# Patient Record
Sex: FEMALE | Race: BLACK OR AFRICAN AMERICAN | Employment: OTHER | URBAN - METROPOLITAN AREA
[De-identification: names, ages, dates, MRNs, and addresses within clinical notes are randomized per-mention and may not be internally consistent; named-entity substitution may affect disease eponyms.]

---

## 2017-09-27 NOTE — PATIENT DISCUSSION
MODERATE KERATOCONJUCTIVITIS WITH DRY EYE, OU:  PATIENT DESIRES TO NOT TAKE RESTASIS ANYMORE. CONTINUE HIGH QUALITY ARTIFICIAL TEARS. RECOMMEND THE DAILY INTAKE OF OMEGA-3 DHA/EPA FATTY ACIDS TO HELP RELIEVE SYMPTOMS WITH PRIMARY CARE PHYSICIANS APPROVAL. IF SYMPTOMS WORSEN, START NIGHTLY LUBRICATING OINTMENT OR GEL. WILL CONSIDER PUNCTAL PLUGS NEXT VISIT IF NOT RESPONSIVE OR IF SYMPTOMS PERSIST. RETURN FOR FOLLOW-UP AS SCHEDULED OR SOONER IF SYMPTOMS WORSEN.

## 2017-09-27 NOTE — PATIENT DISCUSSION
Continue: Refresh Liquigel (carboxymethylcellulose sodium): drops, liquid gel: 1% 1 drop as needed into both eyes

## 2017-10-24 ENCOUNTER — HOSPITAL ENCOUNTER (OUTPATIENT)
Dept: LAB | Age: 62
Discharge: HOME | End: 2017-10-24
Payer: MEDICARE

## 2017-10-24 DIAGNOSIS — R73.03 DIABETES MELLITUS, LATENT: ICD-10-CM

## 2017-10-24 DIAGNOSIS — E07.9 DISEASE OF THYROID GLAND: ICD-10-CM

## 2017-10-24 DIAGNOSIS — E55.9 AVITAMINOSIS D: ICD-10-CM

## 2017-10-24 DIAGNOSIS — F41.9 ANXIETY: ICD-10-CM

## 2017-10-24 DIAGNOSIS — E78.5 HYPERLIPEMIA: ICD-10-CM

## 2017-10-24 DIAGNOSIS — E53.8 HOLOCARBOXYLASE SYNTHETASE DEFICIENCY: ICD-10-CM

## 2017-10-24 DIAGNOSIS — R79.89 HYPOURICEMIA: ICD-10-CM

## 2017-10-24 LAB
25(OH)D3 SERPL-MCNC: 17.9 NG/ML (ref 30–100)
ALBUMIN SERPL-MCNC: 3.9 G/DL (ref 3.4–5)
ALBUMIN/GLOB SERPL: 1.1 {RATIO} (ref 0.8–1.7)
ALP SERPL-CCNC: 53 U/L (ref 45–117)
ALT SERPL-CCNC: 20 U/L (ref 13–56)
ANION GAP SERPL CALC-SCNC: 8 MMOL/L (ref 3–18)
AST SERPL-CCNC: 18 U/L (ref 15–37)
BASOPHILS # BLD: 0 K/UL (ref 0–0.06)
BASOPHILS NFR BLD: 1 % (ref 0–2)
BILIRUB SERPL-MCNC: 0.7 MG/DL (ref 0.2–1)
BUN SERPL-MCNC: 13 MG/DL (ref 7–18)
BUN/CREAT SERPL: 19 (ref 12–20)
CALCIUM SERPL-MCNC: 8.6 MG/DL (ref 8.5–10.1)
CHLORIDE SERPL-SCNC: 103 MMOL/L (ref 100–108)
CHOLEST SERPL-MCNC: 153 MG/DL
CO2 SERPL-SCNC: 28 MMOL/L (ref 21–32)
CREAT SERPL-MCNC: 0.69 MG/DL (ref 0.6–1.3)
DIFFERENTIAL METHOD BLD: NORMAL
EOSINOPHIL # BLD: 0.1 K/UL (ref 0–0.4)
EOSINOPHIL NFR BLD: 3 % (ref 0–5)
ERYTHROCYTE [DISTWIDTH] IN BLOOD BY AUTOMATED COUNT: 14.3 % (ref 11.6–14.5)
FERRITIN SERPL-MCNC: 12 NG/ML (ref 8–388)
GLOBULIN SER CALC-MCNC: 3.6 G/DL (ref 2–4)
GLUCOSE SERPL-MCNC: 88 MG/DL (ref 74–99)
HBA1C MFR BLD: 6.1 % (ref 4.2–5.6)
HCT VFR BLD AUTO: 38.9 % (ref 35–45)
HDLC SERPL-MCNC: 64 MG/DL (ref 40–60)
HDLC SERPL: 2.4 {RATIO} (ref 0–5)
HGB BLD-MCNC: 12.1 G/DL (ref 12–16)
IRON SERPL-MCNC: 54 UG/DL (ref 50–175)
LDLC SERPL CALC-MCNC: 53 MG/DL (ref 0–100)
LIPID PROFILE,FLP: ABNORMAL
LYMPHOCYTES # BLD: 2.2 K/UL (ref 0.9–3.6)
LYMPHOCYTES NFR BLD: 41 % (ref 21–52)
MCH RBC QN AUTO: 24.8 PG (ref 24–34)
MCHC RBC AUTO-ENTMCNC: 31.1 G/DL (ref 31–37)
MCV RBC AUTO: 79.7 FL (ref 74–97)
MONOCYTES # BLD: 0.3 K/UL (ref 0.05–1.2)
MONOCYTES NFR BLD: 6 % (ref 3–10)
NEUTS SEG # BLD: 2.6 K/UL (ref 1.8–8)
NEUTS SEG NFR BLD: 49 % (ref 40–73)
PLATELET # BLD AUTO: 354 K/UL (ref 135–420)
PMV BLD AUTO: 9.2 FL (ref 9.2–11.8)
POTASSIUM SERPL-SCNC: 4 MMOL/L (ref 3.5–5.5)
PROT SERPL-MCNC: 7.5 G/DL (ref 6.4–8.2)
RBC # BLD AUTO: 4.88 M/UL (ref 4.2–5.3)
SODIUM SERPL-SCNC: 139 MMOL/L (ref 136–145)
TRIGL SERPL-MCNC: 180 MG/DL (ref ?–150)
TSH SERPL DL<=0.05 MIU/L-ACNC: 1.07 UIU/ML (ref 0.36–3.74)
VIT B12 SERPL-MCNC: 369 PG/ML (ref 211–911)
VLDLC SERPL CALC-MCNC: 36 MG/DL
WBC # BLD AUTO: 5.3 K/UL (ref 4.6–13.2)

## 2017-10-24 PROCEDURE — 82306 VITAMIN D 25 HYDROXY: CPT | Performed by: NURSE PRACTITIONER

## 2017-10-24 PROCEDURE — 83540 ASSAY OF IRON: CPT | Performed by: NURSE PRACTITIONER

## 2017-10-24 PROCEDURE — 83036 HEMOGLOBIN GLYCOSYLATED A1C: CPT | Performed by: NURSE PRACTITIONER

## 2017-10-24 PROCEDURE — 80061 LIPID PANEL: CPT | Performed by: NURSE PRACTITIONER

## 2017-10-24 PROCEDURE — 82728 ASSAY OF FERRITIN: CPT | Performed by: NURSE PRACTITIONER

## 2017-10-24 PROCEDURE — 84443 ASSAY THYROID STIM HORMONE: CPT | Performed by: NURSE PRACTITIONER

## 2017-10-24 PROCEDURE — 85025 COMPLETE CBC W/AUTO DIFF WBC: CPT | Performed by: NURSE PRACTITIONER

## 2017-10-24 PROCEDURE — 36415 COLL VENOUS BLD VENIPUNCTURE: CPT | Performed by: NURSE PRACTITIONER

## 2017-10-24 PROCEDURE — 82607 VITAMIN B-12: CPT | Performed by: NURSE PRACTITIONER

## 2017-10-24 PROCEDURE — 80053 COMPREHEN METABOLIC PANEL: CPT | Performed by: NURSE PRACTITIONER

## 2017-11-02 ENCOUNTER — HOSPITAL ENCOUNTER (OUTPATIENT)
Dept: GENERAL RADIOLOGY | Age: 62
Discharge: HOME | End: 2017-11-02
Payer: MEDICARE

## 2017-11-02 DIAGNOSIS — R10.9 ABDOMINAL PAIN: ICD-10-CM

## 2017-11-02 PROCEDURE — 74020 XR ABD FLAT/ ERECT: CPT

## 2017-11-30 ENCOUNTER — OFFICE VISIT (OUTPATIENT)
Dept: ORTHOPEDIC SURGERY | Age: 62
End: 2017-11-30

## 2017-11-30 VITALS
HEART RATE: 73 BPM | RESPIRATION RATE: 16 BRPM | OXYGEN SATURATION: 96 % | BODY MASS INDEX: 28.45 KG/M2 | WEIGHT: 177 LBS | HEIGHT: 66 IN | DIASTOLIC BLOOD PRESSURE: 77 MMHG | TEMPERATURE: 97.9 F | SYSTOLIC BLOOD PRESSURE: 124 MMHG

## 2017-11-30 DIAGNOSIS — M75.82 ROTATOR CUFF TENDINITIS, LEFT: ICD-10-CM

## 2017-11-30 DIAGNOSIS — M75.42 SHOULDER IMPINGEMENT, LEFT: Primary | ICD-10-CM

## 2017-11-30 RX ORDER — HYDROCODONE BITARTRATE AND ACETAMINOPHEN 5; 325 MG/1; MG/1
1 TABLET ORAL EVERY OTHER DAY
COMMUNITY
End: 2020-11-13

## 2017-11-30 RX ORDER — GUAIFENESIN 100 MG/5ML
81 LIQUID (ML) ORAL DAILY
COMMUNITY

## 2017-11-30 RX ORDER — PROPRANOLOL HYDROCHLORIDE 20 MG/1
TABLET ORAL 3 TIMES DAILY
COMMUNITY
End: 2019-07-18

## 2017-11-30 RX ORDER — MELOXICAM 15 MG/1
15 TABLET ORAL DAILY
Qty: 90 TAB | Refills: 0 | Status: SHIPPED | OUTPATIENT
Start: 2017-11-30 | End: 2020-01-10

## 2017-11-30 RX ORDER — CYCLOBENZAPRINE HCL 10 MG
TABLET ORAL
COMMUNITY
End: 2020-11-13

## 2017-11-30 NOTE — PATIENT INSTRUCTIONS
Rotator Cuff Problems: Care Instructions  Your Care Instructions  The rotator cuff is a group of tendons and muscles around the shoulder that keeps the shoulder joint stable and allows you to raise and rotate your arm. Over time, daily wear and exercise can cause the tendons to rub on the bones of your shoulder. This is called impingement. This condition may cause the tendons to bruise, degenerate, or tear. In many people, these problems do not cause pain. When they do cause pain, you can use rest, physical therapy, ice and heat, and anti-inflammatory medicine to reduce pain and swelling. If you still have pain after trying these treatments, you and your doctor can discuss having a steroid injection or surgery. Follow-up care is a key part of your treatment and safety. Be sure to make and go to all appointments, and call your doctor if you are having problems. It's also a good idea to know your test results and keep a list of the medicines you take. How can you care for yourself at home? · Be safe with medicines. Read and follow all instructions on the label. ¨ If the doctor gave you a prescription medicine for pain, take it as prescribed. ¨ If you are not taking a prescription pain medicine, ask your doctor if you can take an over-the-counter medicine. · Put ice or a cold pack on your shoulder for 10 to 20 minutes at a time. Try to do this every 1 to 2 hours for the next 3 days (when you are awake). Put a thin cloth between the ice pack and your skin. · After 3 days, put a warm, wet towel on your shoulder. This is to relax the muscles and increase blood flow. While holding the towel on your shoulder, lean forward so your arm hangs freely, and gently swing your arm back and forth like a pendulum. You also can do this standing under a warm shower. · Follow your doctor's advice for physical therapy. When your doctor says it is okay, try these stretching exercises. Do them slowly to avoid injury.  Put a warm, wet towel on your shoulder before exercising. Stop any exercise that increases pain. ¨ Range-of-motion exercises. If it is not too painful, stretch your arm in four directions: across the body, up the back, to the side, and overhead. ¨ Pendulum exercise. Lean forward and hold onto a table or the back of a chair with your good arm. Bend at the waist, letting the arm with the sore shoulder hang straight down. Swing your arm back and forth like a pendulum, then in circles that start small and slowly grow larger. This exercise does not use the arm muscles. Instead, use your legs and your hips to create movement that makes your arm swing freely. Try this for about 5 minutes, several times a day. ¨ Wall climbing (to the side). Stand with your side to a wall so that your fingers can just touch it. Then turn so your body is turned slightly toward the wall. Walk the fingers of your injured arm up the wall as high as pain permits. Try not to shrug your shoulder up toward your ear as you move your arm up. Hold that position for a count of 15 to 30 seconds. Walk your fingers down to the starting position. Repeat 2 to 4 times, trying to reach higher each time. ¨ Wall climbing (to the front). Face a wall, standing so your fingers can just touch it. Walk the fingers of your affected arm up the wall as high as pain permits. Try not to shrug your shoulder up toward your ear as you move your arm up. Hold that position for a count of 15 to 30 seconds. Slowly walk your fingers to the starting position. Repeat 2 to 4 times, trying to reach higher each time. · Rest your shoulder when you are not doing stretches and other exercises. Your doctor may tell you to wait for the pain to go away before doing exercises. Do not lift heavy bags of groceries, play sports, or do anything else that makes you twist or stress your shoulder. Avoid activities where you move your affected arm above your head.   When should you call for help?  Call your doctor now or seek immediate medical care if:  ? · You have severe pain. ? · You cannot move your shoulder or arm. ? · You have tingling or numbness in your arm or hand. ? · Your arm or hand is cool or pale. ? Watch closely for changes in your health, and be sure to contact your doctor if:  ? · Your pain gets worse. ? · You have new or worse swelling in your arm or hand. ? · You do not get better as expected. Where can you learn more? Go to http://eron-michelle.info/. Enter E207 in the search box to learn more about \"Rotator Cuff Problems: Care Instructions. \"  Current as of: March 21, 2017  Content Version: 11.4  © 7258-6409 Healthwise, Inc[employer]. Care instructions adapted under license by [employer] (which disclaims liability or warranty for this information). If you have questions about a medical condition or this instruction, always ask your healthcare professional. [place] [address] any warranty or liability for your use of this information.

## 2017-11-30 NOTE — PROGRESS NOTES
HISTORY OF PRESENT ILLNESS    Lord Malave is a 60y.o. year old female comes in today as new patient to be evaluated and treated at the request of Dr William Lira for my opinion/advice regarding: left shoulder pain    Has had bad pain left shoulder into arm for about 5 months. Worse in bed and improves as she gets up and around. Does sleep on that side. Rated 0/10 now. Does help with BC powder and aspercream.  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 Social History     Social History    Marital status: SINGLE     Spouse name: N/A    Number of children: N/A    Years of education: N/A     Social History Main Topics    Smoking status: Never Smoker    Smokeless tobacco: Not on file    Alcohol use No    Drug use: No    Sexual activity: Not on file     Other Topics Concern    Not on file     Social History Narrative     Current Outpatient Prescriptions   Medication Sig Dispense Refill    oxyCODONE-acetaminophen (PERCOCET) 5-325 mg per tablet Take 1 Tab by mouth every four (4) hours as needed for Pain. Max Daily Amount: 6 Tabs. 30 Tab 0    lisinopril (PRINIVIL, ZESTRIL) 20 mg tablet Take 20 mg by mouth daily. Indications: HYPERTENSION      pravastatin (PRAVACHOL) 20 mg tablet Take 20 mg by mouth nightly.  amLODIPine (NORVASC) 2.5 mg tablet Take 2.5 mg by mouth daily. Indications: HYPERTENSION      diazepam (VALIUM) 5 mg tablet Take 5 mg by mouth every six (6) hours as needed for Anxiety. Past Medical History:   Diagnosis Date    Arthritis     Hypertension     Stroke ([facility] [address] 5/2012     No family history on file. ROS:  No numbness, tingle, swell. All other systems reviewed and negative aside from that written in the HPI. Objective: There were no vitals taken for this visit. HEENT: Conjunctiva/lids WNL. External canals/nares WNL. Tongue midline. PERRL, EOMI. Hearing intact. NECK: Trachea midline. Supple, Full ROM. CARDIAC: RRR. S1S2. No Murmur. LUNGS: CTAB w/ normal effort. ABD: Soft, NT. No HSM. PSYCH: A+O x3. Appropriate judgment and insight. GEN:  Appears stated age in NAD. HEAD:  Normocephalic, Atraumatic. NEURO:  Sensation intact light touch B/L upper extremities. right hand dominant. M/S:  Shoulder ROM Normal  bilaterally. Spurling's negative bilaterally  left Shoulder:  Empty can positive External rotation negative. Internal rotation negative. Crockett negative. SLAP negative. Load and Shift +1 Anterior, 1 Posterior. Strength +5/5 bilaterally upper extremities. Crossover test negative. Negative atrophy bilaterally. Negative TTP at [facility] [place] joint. Apprehension test negative. May-Tarun Test positive. Neer Test negative. Yergason's test negative. Speed's test negative. EXT no Clubbing/cyanosis. no edema. SKIN: Warm, dry w/o rash. Assessment/Plan:     ICD-10-CM ICD-9-CM    1. Shoulder impingement, left M75.42 726.2 REFERRAL TO PHYSICAL THERAPY      meloxicam (MOBIC) 15 mg tablet   2. Rotator cuff tendinitis, left M75.82 726.10 REFERRAL TO PHYSICAL THERAPY      meloxicam (MOBIC) 15 mg tablet       Patient verbalizes understanding of evaluation and plan. Will use mobic and begin PT for impingement I believe happening during sleep causing Sx and RTC 6 weeks.

## 2017-11-30 NOTE — MR AVS SNAPSHOT
Visit Information Date & Time Provider Department Dept. Phone Encounter #  
 11/30/2017  2:20 PM Alexus Leavitt, [address] [facility] [phone] [id number] Follow-up Instructions Return in about 6 weeks (around 1/11/2018) for left shoulder/arm pain. Routing History Upcoming Health Maintenance Date Due Hepatitis C Screening 1955 DTaP/Tdap/Td series (1 - Tdap) 11/12/1975 PAP AKA CERVICAL CYTOLOGY 11/12/1975 BREAST CANCER SCRN MAMMOGRAM 11/12/2004 FOBT Q 1 YEAR AGE 50-75 11/12/2004 ZOSTER VACCINE AGE 60> 9/12/2014 Influenza Age 5 to Adult 8/1/2017 Allergies as of 11/30/2017  Review Complete On: 11/30/2017 By: Alexus Leavitt DO Severity Noted Reaction Type Reactions Prednisone  09/04/2015    Other (comments) Increases BP Current Immunizations  Never Reviewed No immunizations on file. Not reviewed this visit You Were Diagnosed With   
  
 Codes Comments Shoulder impingement, left    -  Primary ICD-10-CM: M75.42 
ICD-9-CM: 726.2 Rotator cuff tendinitis, left     ICD-10-CM: M75.82 ICD-9-CM: 726.10 Vitals BP Pulse Temp Resp Height(growth percentile) Weight(growth percentile) 124/77 (BP 1 Location: Left arm, BP Patient Position: Sitting) 73 97.9 °F (36.6 °C) (Oral) 16 5' 6\" (1.676 m) 177 lb (80.3 kg) SpO2 BMI OB Status Smoking Status 96% 28.57 kg/m2 Hysterectomy Never Smoker BMI and BSA Data Body Mass Index Body Surface Area 28.57 kg/m 2 1.93 m 2 Your Updated Medication List  
  
   
This list is accurate as of: 11/30/17  2:38 PM.  Always use your most recent med list.  
  
  
  
  
 aspirin 81 mg chewable tablet Take 81 mg by mouth daily. cyclobenzaprine 10 mg tablet Commonly known as:  FLEXERIL Take  by mouth three (3) times daily as needed for Muscle Spasm(s). HYDROcodone-acetaminophen 5-325 mg per tablet Commonly known as:  Sandra Remy Take  by mouth.  
  
 lisinopril 20 mg tablet Commonly known as:  Cheryl Bro Take 40 mg by mouth daily. Indications: hypertension  
  
 meloxicam 15 mg tablet Commonly known as:  MOBIC Take 1 Tab by mouth daily. NORVASC 2.5 mg tablet Generic drug:  amLODIPine Take 2.5 mg by mouth daily. Indications: HYPERTENSION  
  
 oxyCODONE-acetaminophen 5-325 mg per tablet Commonly known as:  PERCOCET Take 1 Tab by mouth every four (4) hours as needed for Pain. Max Daily Amount: 6 Tabs. pravastatin 20 mg tablet Commonly known as:  PRAVACHOL Take 20 mg by mouth nightly. propranolol 20 mg tablet Commonly known as:  INDERAL Take  by mouth three (3) times daily. VALIUM 5 mg tablet Generic drug:  diazePAM  
Take 5 mg by mouth every six (6) hours as needed for Anxiety. Prescriptions Printed Refills  
 meloxicam (MOBIC) 15 mg tablet 0 Sig: Take 1 Tab by mouth daily. Class: Print Route: Oral  
  
We Performed the Following REFERRAL TO PHYSICAL THERAPY [SVG36 Custom] Comments:  
 Evaluate and Treat 3 per week for 4-6 weeks 
 
[phone] Follow-up Instructions Return in about 6 weeks (around 1/11/2018) for left shoulder/arm pain. Referral Information Referral ID Referred By Referred To  
  
 [id number] Abiola Rizzo [address] [place] [address] Phone: [phone] Visits Status Start Date End Date 1 New Request 11/30/17 11/30/18 If your referral has a status of pending review or denied, additional information will be sent to support the outcome of this decision. Patient Instructions Rotator Cuff Problems: Care Instructions Your Care Instructions The rotator cuff is a group of tendons and muscles around the shoulder that keeps the shoulder joint stable and allows you to raise and rotate your arm. Over time, daily wear and exercise can cause the tendons to rub on the bones of your shoulder. This is called impingement. This condition may cause the tendons to bruise, degenerate, or tear. In many people, these problems do not cause pain. When they do cause pain, you can use rest, physical therapy, ice and heat, and anti-inflammatory medicine to reduce pain and swelling. If you still have pain after trying these treatments, you and your doctor can discuss having a steroid injection or surgery. Follow-up care is a key part of your treatment and safety. Be sure to make and go to all appointments, and call your doctor if you are having problems. It's also a good idea to know your test results and keep a list of the medicines you take. How can you care for yourself at home? · Be safe with medicines. Read and follow all instructions on the label. ¨ If the doctor gave you a prescription medicine for pain, take it as prescribed. ¨ If you are not taking a prescription pain medicine, ask your doctor if you can take an over-the-counter medicine. · Put ice or a cold pack on your shoulder for 10 to 20 minutes at a time. Try to do this every 1 to 2 hours for the next 3 days (when you are awake). Put a thin cloth between the ice pack and your skin. · After 3 days, put a warm, wet towel on your shoulder. This is to relax the muscles and increase blood flow. While holding the towel on your shoulder, lean forward so your arm hangs freely, and gently swing your arm back and forth like a pendulum. You also can do this standing under a warm shower. · Follow your doctor's advice for physical therapy. When your doctor says it is okay, try these stretching exercises. Do them slowly to avoid injury. Put a warm, wet towel on your shoulder before exercising. Stop any exercise that increases pain. ¨ Range-of-motion exercises.  If it is not too painful, stretch your arm in four directions: across the body, up the back, to the side, and overhead. ¨ Pendulum exercise. Lean forward and hold onto a table or the back of a chair with your good arm. Bend at the waist, letting the arm with the sore shoulder hang straight down. Swing your arm back and forth like a pendulum, then in circles that start small and slowly grow larger. This exercise does not use the arm muscles. Instead, use your legs and your hips to create movement that makes your arm swing freely. Try this for about 5 minutes, several times a day. ¨ Wall climbing (to the side). Stand with your side to a wall so that your fingers can just touch it. Then turn so your body is turned slightly toward the wall. Walk the fingers of your injured arm up the wall as high as pain permits. Try not to shrug your shoulder up toward your ear as you move your arm up. Hold that position for a count of 15 to 30 seconds. Walk your fingers down to the starting position. Repeat 2 to 4 times, trying to reach higher each time. ¨ Wall climbing (to the front). Face a wall, standing so your fingers can just touch it. Walk the fingers of your affected arm up the wall as high as pain permits. Try not to shrug your shoulder up toward your ear as you move your arm up. Hold that position for a count of 15 to 30 seconds. Slowly walk your fingers to the starting position. Repeat 2 to 4 times, trying to reach higher each time. · Rest your shoulder when you are not doing stretches and other exercises. Your doctor may tell you to wait for the pain to go away before doing exercises. Do not lift heavy bags of groceries, play sports, or do anything else that makes you twist or stress your shoulder. Avoid activities where you move your affected arm above your head. When should you call for help? Call your doctor now or seek immediate medical care if: 
? · You have severe pain. ? · You cannot move your shoulder or arm. ? · You have tingling or numbness in your arm or hand. ? · Your arm or hand is cool or pale. ? Watch closely for changes in your health, and be sure to contact your doctor if: 
? · Your pain gets worse. ? · You have new or worse swelling in your arm or hand. ? · You do not get better as expected. Where can you learn more? Go to http://eron-michelle.info/. Enter E207 in the search box to learn more about \"Rotator Cuff Problems: Care Instructions. \" Current as of: March 21, 2017 Content Version: 11.4 © 4493-9643 [employer]. Care instructions adapted under license by [employer] (which disclaims liability or warranty for this information). If you have questions about a medical condition or this instruction, always ask your healthcare professional. [address] any warranty or liability for your use of this information. Introducing [facility]! [employer] introduces [facility] patient portal. Now you can access parts of your medical record, email your doctor's office, and request medication refills online. 1. In your internet browser, go to [email or web address] 2. Click on the First Time User? Click Here link in the Sign In box. You will see the New Member Sign Up page. 3. Enter your [facility] Access Code exactly as it appears below. You will not need to use this code after youve completed the sign-up process. If you do not sign up before the expiration date, you must request a new code. · [facility] Access Code: [email or web address] Expires: 2/28/2018  2:38 PM 
 
4. Enter the last four digits of your Social Security Number (xxxx) and Date of Birth (mm/dd/yyyy) as indicated and click Submit. You will be taken to the next sign-up page. 5. Create a [facility] ID. This will be your [facility] login ID and cannot be changed, so think of one that is secure and easy to remember. 6. Create a [employer] password. You can change your password at any time. 7. Enter your Password Reset Question and Answer. This can be used at a later time if you forget your password. 8. Enter your e-mail address. You will receive e-mail notification when new information is available in [address] 9. Click Sign Up. You can now view and download portions of your medical record. 10. Click the Download Summary menu link to download a portable copy of your medical information. If you have questions, please visit the Frequently Asked Questions section of the [employer] website. Remember, [employer] is NOT to be used for urgent needs. For medical emergencies, dial 911. Now available from your iPhone and Android! Please provide this summary of care documentation to your next provider. Your primary care clinician is listed as [address]. If you have any questions after today's visit, please call [phone].

## 2017-12-11 ENCOUNTER — APPOINTMENT (OUTPATIENT)
Dept: PHYSICAL THERAPY | Age: 62
End: 2017-12-11

## 2017-12-27 ENCOUNTER — HOSPITAL ENCOUNTER (OUTPATIENT)
Dept: PHYSICAL THERAPY | Age: 62
Discharge: HOME | End: 2017-12-27
Payer: MEDICARE

## 2017-12-27 PROCEDURE — 97110 THERAPEUTIC EXERCISES: CPT

## 2017-12-27 PROCEDURE — 97162 PT EVAL MOD COMPLEX 30 MIN: CPT

## 2017-12-27 PROCEDURE — G8984 CARRY CURRENT STATUS: HCPCS

## 2017-12-27 PROCEDURE — G8985 CARRY GOAL STATUS: HCPCS

## 2017-12-27 PROCEDURE — 97140 MANUAL THERAPY 1/> REGIONS: CPT

## 2017-12-27 NOTE — PROGRESS NOTES
PT SHOULDER EVAL AND TREATMENT      Patient Name: Sriram Kay  Date:2017  : 1955  [x]  Patient  Verified  Payor: [place] MEDICARE / Plan: [place] MEDICARE PART A & B / Product Type: Medicare /    In time:1205  Out time:100  Total Treatment Time (min): 55  Total Timed Codes (min): 23  1:1 Treatment Time ( only): 45   Visit #: 1 of 8-10    Treatment Area: Left shoulder pain [M25.512]    SUBJECTIVE  Pain Level (0-10 scale):  (C): 4  (B): 4 (W):  7  Any medication changes, allergies to medications, diagnosis change, or new procedure performed: see summary sheet for update  Subjective functional status/changes:  CHIEF COMPLAINT:  Patient reports with co insidious onset L shoulder pain that started after waking up from laying on the L side starting approx 9 mo ago. Patient recalls greater than 1 year ago she experienced similar pain when lifting 3.5# wts overhead and was dx with \"pinched nerve. \" Patient is using BC powder, heating pad and Aspercreme for pain relief. PMH significant for CVA IN  affecting L side, and B TKR. Patient is R hand dominant. DEFICITS: sleeping on L side, pain in am, reaching behind, heavy lifting   OBJECTIVE:   Posture: FWD HEAD     ROM:  WNL throughout - pain at end range flexion and ABD - UT pain                                               Strength:                                                                         L (1-5) R (1-5) Pain   Flexors 4 5 _ Yes   [] No   Abductors 4 5 [] Yes   [] No   External Rotators 4 5 [] Yes   [] No   Internal Rotators 4+ 5 [] Yes   [] No   Serratus Anterior   [] Yes   [] No    strength :  WNL     Scapulohumoral Control / Rhythm:  Able to eccentrically lower with good control?  Left: [x] Yes   [] No     Right: [x] Yes   [] No    Palpation  [] Min  [] Mod  [x] Severe    Location: ant fibers of UT, pect major attached to clavicle     Other Tests / Comments:   CS rotation : painful B     Patient Education/ Therapeutic Exercise : [x] Discussed POT including PT expectation, established HEP with pictures and instruction  (minutes) : 8    Manual: 15 min : in R SL - DTM to L UT, LS, scalenes and pect major/ minor     Modality (rationale): decrease tissue tension   [x]  MHP 10 min to L shoulder     Pain Level (0-10 scale) post treatment: 3    ASSESSMENT  [x]  See Plan of Care    PLAN  [x]  Upgrade activities as tolerated  [x] Other:_POC   2-3 x 8-10    Janell Alves, PT 12/27/2017      Justification for Eval Code Complexity:  Patient History : multiple co morbidities: B TKR, CVA effect L, depression, arthritis   Examination see exam   Clinical Presentation: evolving sec to progressive worsening   Clinical Decision Making : FOTO : 55 /100

## 2017-12-27 NOTE — PROGRESS NOTES
[address] [facility] [address] [facility] AT [address] [place] 56  Phone: [phone] Fax: [phone] / [address] [address] PHYSICAL THERAPY SERVICES  Patient Name: Paul Fu : 1955   Medical   Diagnosis: Left shoulder pain [M25.512] Treatment Diagnosis: L shoulder impingement. , myofascial pain   Onset Date: approx 9 mo ago      Referral Source: Christina Henderson DO Start of Care [facility]): 2017   Prior Hospitalization: See medical history Provider #: [id number]   Prior Level of Function: Functional I with UE    Comorbidities: CVA, depression, arthritis, HTN B TKR    Medications: Verified on Patient Summary List   The Plan of Care and following information is based on the information from the initial evaluation.   ========================================================================  Assessment / key information:  Pt is a 60y.o. year old female who presents with co insidious onset L shoulder pain that started after waking up from laying on the L side starting approx 9 mo ago. Patient recalls greater than 1 year ago she experienced similar pain when lifting 3.5# wts overhead and was dx with \"pinched nerve. \" Patient is using BC powder, heating pad and Aspercreme for pain relief. PMH significant for CVA IN  affecting L side, and B TKR. Patient is R hand dominant. Current deficits include: increased pain to 7/10 at worst, increased pain with AROM at end range with UT compensation, myofascial tightness and tenderness of  L UT, LS, scalenes and pect major and minor,  Decreased RTC strength to grossly 4/5, and pain with CS rotation at end range. Functional deficits include: sleeping on L side, pain in am, reaching behind, heavy lifting . Home exercise program initiated on initial evaluation to address these deficits.  Pt would benefit from PT to address these deficits for increased functional mobility and QOL.  ========================================================================  Eval Complexity: History: HIGH Complexity :3+ comorbidities / personal factors will impact the outcome/ POC Exam:HIGH Complexity : 4+ Standardized tests and measures addressing body structure, function, activity limitation and / or participation in recreation  Presentation: MEDIUM Complexity : Evolving with changing characteristics  Clinical Decision Making:MEDIUM Complexity : FOTO score of 26-74Overall Complexity:MEDIUM  Problem List: pain affecting function, decrease ROM, decrease strength, decrease activity tolerance, decrease flexibility/ joint mobility and other FOTO 55/100   Treatment Plan may include any combination of the following: Therapeutic exercise, Therapeutic activities, Neuromuscular re-education, Physical agent/modality, Manual therapy, Aquatic therapy and Patient education  Patient / Family readiness to learn indicated by: asking questions, trying to perform skills and interest  Persons(s) to be included in education: patient (P)  Barriers to Learning/Limitations: None  Measures taken:    Patient Goal (s): \"find out problem , strengthen muscles \"   Patient self reported health status: good  Rehabilitation Potential: good   Short Term Goals: To be accomplished in  1  weeks:  1. Pt will be independent and compliant with HEP   Long Term Goals: To be accomplished in  8-10  treatments:  1. Patient will increase FOTO score to 64/100 for indications of increased functional mobility. 2.  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 Patient will demo CS rotation with pain less than 2/10 for ease and safety with driving   4.  Patient will report 50% improvement in sleeping on L side for improved sleep quality   Frequency / Duration:   Patient to be seen  2-3  times per week for 8-10  treatments:  Patient / Caregiver education and instruction: self care, activity modification and exercises  G-Codes (GP): Carry: [id number] Current  CK= 40-59%    Goal  CJ= 20-39%. The severity rating is based on the FOTO Score  Therapist Signature: Mikael Monae PT Date: 13/54/0156   Certification Period: 12/27/17- 3/25/18 Time: 109pm    ========================================================================  I certify that the above Physical Therapy Services are being furnished while the patient is under my care. I agree with the treatment plan and certify that this therapy is necessary. Physician Signature:        Date:       Time:   Please sign and return to [facility] or you may fax the signed copy to [phone]. Thank you.

## 2018-01-02 ENCOUNTER — HOSPITAL ENCOUNTER (OUTPATIENT)
Dept: PHYSICAL THERAPY | Age: 63
Discharge: HOME | End: 2018-01-02
Payer: MEDICARE

## 2018-01-02 PROCEDURE — 97110 THERAPEUTIC EXERCISES: CPT

## 2018-01-02 PROCEDURE — 97140 MANUAL THERAPY 1/> REGIONS: CPT

## 2018-01-04 ENCOUNTER — HOSPITAL ENCOUNTER (OUTPATIENT)
Dept: PHYSICAL THERAPY | Age: 63
End: 2018-01-04
Payer: MEDICARE

## 2018-01-09 ENCOUNTER — APPOINTMENT (OUTPATIENT)
Dept: PHYSICAL THERAPY | Age: 63
End: 2018-01-09
Payer: MEDICARE

## 2018-01-11 ENCOUNTER — APPOINTMENT (OUTPATIENT)
Dept: PHYSICAL THERAPY | Age: 63
End: 2018-01-11
Payer: MEDICARE

## 2018-01-16 ENCOUNTER — APPOINTMENT (OUTPATIENT)
Dept: PHYSICAL THERAPY | Age: 63
End: 2018-01-16
Payer: MEDICARE

## 2018-01-18 ENCOUNTER — APPOINTMENT (OUTPATIENT)
Dept: PHYSICAL THERAPY | Age: 63
End: 2018-01-18
Payer: MEDICARE

## 2018-01-23 ENCOUNTER — APPOINTMENT (OUTPATIENT)
Dept: PHYSICAL THERAPY | Age: 63
End: 2018-01-23
Payer: MEDICARE

## 2018-01-25 ENCOUNTER — APPOINTMENT (OUTPATIENT)
Dept: PHYSICAL THERAPY | Age: 63
End: 2018-01-25
Payer: MEDICARE

## 2018-02-08 NOTE — PROGRESS NOTES
[address] [facility] [address] [facility] AT [address], [place], [place] [address]  Phone: [phone] Fax [phone] SUMMARY  Patient Name: Naveen Ervin : 1955   Treatment/Medical Diagnosis: Left shoulder pain [M25.512]   Referral Source: Joseph Walker DO     Date of Initial Visit: 17 Attended Visits: 2 Missed Visits: 2     SUMMARY OF TREATMENT  Patient self DC after eval and one FU reporting interference with other MD apts. Unable for formally reassess goals sec to non return. RECOMMENDATIONS  Discontinue therapy due to lack of attendance or compliance. Gcode: Carry:   Goal  CJ= 20-39%   D/C  CK= 40-59%. The severity rating is based on the FOTO Score    If you have any questions/comments please contact us directly at [phone]. Thank you for allowing us to assist in the care of your patient.   Therapist Signature: Galo Hercules PT Date: 18   Reporting Period: 17-18 Time: 1229pm

## 2018-02-27 ENCOUNTER — HOSPITAL ENCOUNTER (OUTPATIENT)
Dept: RESPIRATORY THERAPY | Age: 63
Discharge: HOME | End: 2018-02-27

## 2018-02-27 DIAGNOSIS — R06.89 DIFFICULTY BREATHING: ICD-10-CM

## 2018-03-28 NOTE — PATIENT DISCUSSION
Continue: PreserVision AREDS 2 (vit c,i-dl-dudkg-lutein-zeaxan): capsule: 710-028-42-8 mg-unit-mg-mg

## 2018-08-13 NOTE — PATIENT DISCUSSION
Continue: PreserVision AREDS 2 (vit c,g-wz-xntpz-lutein-zeaxan): capsule: 168-438-58-8 mg-unit-mg-mg

## 2018-08-13 NOTE — PATIENT DISCUSSION
Continue: Systane Ultra (peg 400-propylene glycol): drops: 0.4-0.3% 1 drop twice a day as needed into both eyes

## 2018-08-13 NOTE — PATIENT DISCUSSION
MODERATE KERATOCONJUCTIVITIS WITH DRY EYE, OU:  CONTINUE HIGH QUALITY ARTIFICIAL TEARS BID - TID. RECOMMEND THE DAILY INTAKE OF OMEGA-3 DHA/EPA FATTY ACIDS TO HELP RELIEVE SYMPTOMS WITH PRIMARY CARE PHYSICIANS APPROVAL. ADD REFRESH CELLUVISC NIGHTLY LUBRICATING OINTMENT. PATIENT DOES NOT WANT TO TAKE RESTASIS. WILL CONSIDER PUNCTAL PLUGS NEXT VISIT IF NOT RESPONSIVE OR IF SYMPTOMS PERSIST. RETURN FOR FOLLOW-UP AS SCHEDULED OR SOONER IF SYMPTOMS WORSEN.

## 2018-08-13 NOTE — PATIENT DISCUSSION
AMD (DRY), OU:  PRESCRIBE AREDS 2 VITAMINS / AMSLER GRID DAILY / UV PROTECTION. SMOKING AVOIDANCE ADVISED. RETURN TO RETINAL SPECIALIST, DR. Bolton Officer AS SCHEDULED.

## 2018-12-04 NOTE — PATIENT DISCUSSION
AWA, OD: PRESCRIBED WARM COMPRESSES 10 MINUTES QID AND MAXITROL OINTMENT BID APPLIED WITH LID MASSAGE FOLLOWING WARM COMPRESSES. PATIENT ADVISED TO RETURN TO CLINIC FOR FOLLOW UP IN 1 MONTH WITH DR WHITTEN IF THERE IS NOT FULL RESOLUTION, OR SOONER IF SYMPTOMS WORSEN.

## 2018-12-04 NOTE — PATIENT DISCUSSION
New Prescription: Maxitrol (neomycin-polymyxin-dexameth): ointment: 3.5-10,000-0.1 mg-unit/g-% a small amount twice a day into right eye 12-

## 2018-12-04 NOTE — PATIENT DISCUSSION
Continue: PreserVision AREDS 2 (vit c,e-qp-gxnqk-lutein-zeaxan): capsule: 534-951-00-3 mg-unit-mg-mg

## 2018-12-04 NOTE — PATIENT DISCUSSION
PHOTOPSIA, OD: ONLY MILDLY SYMPTOMATIC. DILATED FUNDUS EXAM REVEALED NO BREAKS, HOLES OR TEARS OD. MONITOR FOR CHANGES AND RETURN ASAP WITH ANY SUDDEN WORSENING OF SYMPTOMS OR LOSS OF VISION.

## 2019-01-11 ENCOUNTER — HOSPITAL ENCOUNTER (OUTPATIENT)
Dept: ULTRASOUND IMAGING | Age: 64
Discharge: HOME | End: 2019-01-11
Attending: PLASTIC SURGERY
Payer: MEDICARE

## 2019-01-11 DIAGNOSIS — K43.2 RECURRENT INCISIONAL HERNIA: ICD-10-CM

## 2019-01-11 PROCEDURE — 76705 ECHO EXAM OF ABDOMEN: CPT

## 2019-01-11 NOTE — PATIENT DISCUSSION
New Prescription: TobraDex (tobramycin-dexamethasone): ointment: 0.3-0.1% 1 a small amount three times a day as directed into both eyes 01-

## 2019-01-11 NOTE — PATIENT DISCUSSION
Continue: PreserVision AREDS 2 (vit c,u-ll-bxinr-lutein-zeaxan): capsule: 677-861-23-6 mg-unit-mg-mg

## 2019-01-18 NOTE — PATIENT DISCUSSION
Continue: PreserVision AREDS 2 (vit c,y-vd-mcxto-lutein-zeaxan): capsule: 355-155-80-6 mg-unit-mg-mg

## 2019-01-18 NOTE — PATIENT DISCUSSION
Continue: TobraDex (tobramycin-dexamethasone): ointment: 0.3-0.1% 1 a small amount three times a day as directed into both eyes 01-

## 2019-01-18 NOTE — PATIENT DISCUSSION
BLEPHARITIS OU WITH MILD PRESEPTAL CELLULITIS OD. RESOLVED. OK TO STOP 90 [facility].

## 2019-02-22 NOTE — PATIENT DISCUSSION
Continue: PreserVision AREDS 2 (vit c,l-vf-xalko-lutein-zeaxan): capsule: 455-511-76-4 mg-unit-mg-mg

## 2019-03-08 NOTE — PATIENT DISCUSSION
LAGOPHTHALMOS, OU:  INCREASE OCULAR LUBRICATION WITH ERYTHROMYCIN OINTMENT OR REFRESH PM QHS OU AND USE OF SLEEP MASK AT NIGHT. AVOID SLEEPING WITH FAN ON. CONSIDER REFERRAL FOR SURGICAL REPAIR IF SYMPTOMS PERSIST.

## 2019-03-08 NOTE — PATIENT DISCUSSION
Continue: PreserVision AREDS 2 (vit c,d-vq-cedhf-lutein-zeaxan): capsule: 239-663-02-3 mg-unit-mg-mg

## 2019-03-08 NOTE — PATIENT DISCUSSION
Lagophthalmos Counseling:  I have counseled the patient regarding lagophthalmos and the subsequent corneal exposure that has resulted. I educated the patient on lubricating the eye generously throughout the day to prevent dryness and to tape the eyelid shut at night. I also counseled the patient regarding other treatment options, including patching the eye, gold weight, or partial tarsorrhaphy if the exposure worsens. Return for follow-up as scheduled or sooner if symptoms worsen.

## 2019-03-08 NOTE — PATIENT DISCUSSION
New Prescription: erythromycin (erythromycin): ointment: 5 mg/gram (0.5 %) a small amount at bedtime as directed into both eyes 03-

## 2019-07-02 ENCOUNTER — HOSPITAL ENCOUNTER (OUTPATIENT)
Dept: ULTRASOUND IMAGING | Age: 64
Discharge: HOME | End: 2019-07-02
Attending: NURSE PRACTITIONER
Payer: MEDICARE

## 2019-07-02 DIAGNOSIS — R10.30 LOWER ABDOMINAL PAIN: ICD-10-CM

## 2019-07-02 PROCEDURE — 76830 TRANSVAGINAL US NON-OB: CPT

## 2019-07-30 ENCOUNTER — HOSPITAL ENCOUNTER (OUTPATIENT)
Dept: LAB | Age: 64
Discharge: HOME | End: 2019-07-30
Payer: MEDICARE

## 2019-07-30 PROCEDURE — 88175 CYTOPATH C/V AUTO FLUID REDO: CPT

## 2019-08-14 NOTE — PATIENT DISCUSSION
MODERATE KERATOCONJUCTIVITIS WITH DRY EYE, OU:  CONTINUE HIGH QUALITY ARTIFICIAL TEARS BID - TID. RECOMMEND THE DAILY INTAKE OF OMEGA-3 DHA/EPA FATTY ACIDS TO HELP RELIEVE SYMPTOMS WITH PRIMARY CARE PHYSICIANS APPROVAL. ADD NIGHTLY LUBRICATING OINTMENT OR GEL. CONTINUE RESTASIS BID OU. RETURN FOR FOLLOW-UP AS SCHEDULED OR SOONER IF SYMPTOMS WORSEN.

## 2019-08-14 NOTE — PATIENT DISCUSSION
Continue: PreserVision AREDS 2 (vit c,i-cq-zgiqx-lutein-zeaxan): capsule: 682-410-91-2 mg-unit-mg-mg 1 capsule twice a day by mouth

## 2019-08-14 NOTE — PATIENT DISCUSSION
DERMATOCHALASIS / PTOSIS RUL AND ROSALBA :  VISUALLY SIGNIFICANT TO PATIENT. SCHEDULE WITH OCULOPLASTIC SPECIALIST IF PATIENT DESIRES.

## 2019-08-14 NOTE — PATIENT DISCUSSION
Continue: erythromycin (erythromycin): ointment: 5 mg/gram (0.5 %) 1 a small amount at bedtime as directed into both eyes 03-

## 2019-11-11 ENCOUNTER — HOSPITAL ENCOUNTER (OUTPATIENT)
Dept: LAB | Age: 64
Discharge: HOME | End: 2019-11-11
Payer: MEDICARE

## 2019-11-11 LAB
FERRITIN SERPL-MCNC: 19 NG/ML (ref 8–388)
IRON SATN MFR SERPL: 16 %
IRON SERPL-MCNC: 58 UG/DL (ref 50–175)
RETICS/RBC NFR AUTO: 1.7 % (ref 0.5–2.3)
TIBC SERPL-MCNC: 357 UG/DL (ref 250–450)
VIT B12 SERPL-MCNC: 323 PG/ML (ref 211–911)

## 2019-11-11 PROCEDURE — 85045 AUTOMATED RETICULOCYTE COUNT: CPT

## 2019-11-11 PROCEDURE — 82728 ASSAY OF FERRITIN: CPT

## 2019-11-11 PROCEDURE — 84155 ASSAY OF PROTEIN SERUM: CPT

## 2019-11-11 PROCEDURE — 82607 VITAMIN B-12: CPT

## 2019-11-11 PROCEDURE — 83540 ASSAY OF IRON: CPT

## 2019-11-11 PROCEDURE — 82747 ASSAY OF FOLIC ACID RBC: CPT

## 2019-11-11 PROCEDURE — 36415 COLL VENOUS BLD VENIPUNCTURE: CPT

## 2019-11-11 PROCEDURE — 82955 ASSAY OF G6PD ENZYME: CPT

## 2019-11-12 LAB
ALBUMIN SERPL ELPH-MCNC: 3.7 G/DL (ref 2.9–4.4)
ALBUMIN/GLOB SERPL: 1.1 {RATIO} (ref 0.7–1.7)
ALPHA1 GLOB SERPL ELPH-MCNC: 0.2 G/DL (ref 0–0.4)
ALPHA2 GLOB SERPL ELPH-MCNC: 0.8 G/DL (ref 0.4–1)
B-GLOBULIN SERPL ELPH-MCNC: 1.2 G/DL (ref 0.7–1.3)
FOLATE BLD-MCNC: 291.6 NG/ML
FOLATE RBC-MCNC: 778 NG/ML
G6PD BLD QN: 222 U/10E12 RBC (ref 146–376)
GAMMA GLOB SERPL ELPH-MCNC: 1.1 G/DL (ref 0.4–1.8)
GLOBULIN SER CALC-MCNC: 3.3 G/DL (ref 2.2–3.9)
HCT VFR BLD AUTO: 37.5 % (ref 34–46.6)
M PROTEIN SERPL ELPH-MCNC: NORMAL G/DL
PROT SERPL-MCNC: 7 G/DL (ref 6–8.5)
RBC # BLD AUTO: 4.67 X10E6/UL (ref 3.77–5.28)

## 2019-12-11 ENCOUNTER — HOSPITAL ENCOUNTER (OUTPATIENT)
Dept: CT IMAGING | Age: 64
Discharge: HOME | End: 2019-12-11
Attending: INTERNAL MEDICINE
Payer: MEDICARE

## 2019-12-11 DIAGNOSIS — R10.84 GENERALIZED ABDOMINAL PAIN: ICD-10-CM

## 2019-12-11 LAB — CREAT UR-MCNC: 0.9 MG/DL (ref 0.6–1.3)

## 2019-12-11 PROCEDURE — 74177 CT ABD & PELVIS W/CONTRAST: CPT

## 2019-12-11 PROCEDURE — 74011000255 HC RX REV CODE- 255: Performed by: INTERNAL MEDICINE

## 2019-12-11 PROCEDURE — 74011636320 HC RX REV CODE- 636/320: Performed by: INTERNAL MEDICINE

## 2019-12-11 PROCEDURE — 82565 ASSAY OF CREATININE: CPT

## 2019-12-11 RX ORDER — BARIUM SULFATE 20 MG/ML
900 SUSPENSION ORAL
Status: COMPLETED | OUTPATIENT
Start: 2019-12-11 | End: 2019-12-11

## 2019-12-11 RX ADMIN — BARIUM SULFATE 900 ML: 20 SUSPENSION ORAL at 12:59

## 2019-12-11 RX ADMIN — IOPAMIDOL 96 ML: 612 INJECTION, SOLUTION INTRAVENOUS at 12:59

## 2020-01-13 PROBLEM — D50.9 IRON (FE) DEFICIENCY ANEMIA: Status: ACTIVE | Noted: 2020-01-13

## 2020-01-22 ENCOUNTER — HOSPITAL ENCOUNTER (OUTPATIENT)
Dept: GENERAL RADIOLOGY | Age: 65
Discharge: HOME | End: 2020-01-22
Attending: INTERNAL MEDICINE
Payer: MEDICARE

## 2020-01-22 DIAGNOSIS — D50.9 IRON DEFICIENCY ANEMIA, UNSPECIFIED: ICD-10-CM

## 2020-01-22 PROCEDURE — 74011000255 HC RX REV CODE- 255: Performed by: INTERNAL MEDICINE

## 2020-01-22 PROCEDURE — 74250 X-RAY XM SM INT 1CNTRST STD: CPT

## 2020-01-22 RX ADMIN — BARIUM SULFATE 600 ML: 240 SUSPENSION ORAL at 08:32

## 2021-08-17 ENCOUNTER — HOSPITAL ENCOUNTER (OUTPATIENT)
Dept: PHYSICAL THERAPY | Age: 66
Discharge: HOME | End: 2021-08-17
Payer: MEDICARE

## 2021-08-17 PROCEDURE — 97140 MANUAL THERAPY 1/> REGIONS: CPT

## 2021-08-17 PROCEDURE — 97162 PT EVAL MOD COMPLEX 30 MIN: CPT

## 2021-08-17 PROCEDURE — 97535 SELF CARE MNGMENT TRAINING: CPT

## 2021-08-17 NOTE — PROGRESS NOTES
PT DAILY TREATMENT NOTE     Patient Name: Timothy Pride  Date:2021  : 1955  [x]  Patient  Verified  Payor: Oni Manzano / Plan: [employer] [place] / Product Type: Managed Care Medicare /    In time:350  Out time:450  Total Treatment Time (min): 60  Visit #: 1 of 16    Medicare/BCBS Only   Total Timed Codes (min):  23 1:1 Treatment Time:  60       Treatment Area: Urinary frequency [R35.0]  Bladder pain [R39.89]  Nocturia [R35.1]    SUBJECTIVE  Pain Level (0-10 scale): 5/10  Any medication changes, allergies to medications, adverse drug reactions, diagnosis change, or new procedure performed?: [x] No    [] Yes (see summary sheet for update)  Subjective functional status/changes:   [] No changes reported  Patient initial eval today see POC for details    OBJECTIVE    37 min [x]Eval                  []Re-Eval         15 min Self Care: Thorough review and handout provided on the following: bladder irritants, water goal, thorough education in importance of water intake to dec constipation   Rationale:  Increase awareness and understanding of current condition to improve patients ability to independently and effectively attain goals and progress towards long term management of current condition. 8 min Manual Therapy:  stm to adhesions in LLQ, and stm to trigger points in L TFL, L psoas release   Rationale: decrease pain and increase tissue extensibility in order to Decrease nocturia and Improve frequency and ease of bowel movements.             With   [] TE   [] TA   [] neuro   [] other: Patient Education: [x] Review HEP    [] Progressed/Changed HEP based on:   [] positioning   [] body mechanics   [] transfers   [] heat/ice application    [] other:      Other Objective/Functional Measures:    Justification for Eval Code Complexity:  Patient History : see POC   Examination see exam   Clinical Presentation: evolving  Clinical Decision Making : FOTO : 72/100    Pain Level (0-10 scale) post treatment: 5/10    ASSESSMENT/Changes in Function: Patient tolerated today's session well with no c/o pain. Patient demonstrated understanding of today's instruction, education, and recommendations. Pt was given hand out detailing bladder irritants and water intake goal and instructed in modification of diet to address bladder pain and constipation. Pt verbalized understanding. Patient will continue to benefit from skilled PT services to modify and progress therapeutic interventions, address functional mobility deficits, address ROM deficits, address strength deficits, analyze and address soft tissue restrictions, analyze and cue movement patterns, analyze and modify body mechanics/ergonomics, assess and modify postural abnormalities, address imbalance/dizziness and instruct in home and community integration to attain remaining goals.      [x]  See Plan of Care  []  See progress note/recertification  []  See Discharge Summary         Progress towards goals / Updated goals:  No progress as goals were set today    PLAN  []  Upgrade activities as tolerated     [x]  Continue plan of care  [x]  Update interventions per flow sheet       []  Discharge due to:_  []  Other:_        Edita Clamp 8/17/2021  12:21 PM    Future Appointments   Date Time Provider Lalo Espino   8/17/2021  3:45 PM Joycelyn Mcclaino [facility]   9/10/2021 10:00 AM Regino Escamilla NP [address]

## 2021-08-17 NOTE — PROGRESS NOTES
[address] [facility] AT [address], [address] [place] [address] Road  Phone: [phone]   [phone]  PLAN OF CARE / [address] [place] [address] PHYSICAL THERAPY SERVICES  Patient Name: Kamlesh Albarran : 1955   Medical   Diagnosis: Urinary frequency [R35.0]  Bladder pain [R39.89]  Nocturia [R35.1] Treatment Diagnosis: Urinary frequency [R35.0]  Bladder pain [R39.89]  Nocturia [R35.1]   Onset Date: ~     Referral Source: Tianna Lorenzo MD [facility]): 2021   Prior Hospitalization: See medical history Provider #: [id number]   Prior Level of Function: No hx of urine problem prior to    Comorbidities:  PMH relevant for kidney stone, tummy tuck, partial hysterectomy, hernia repair, stroke, JUSTO TKA, hx of fibroids   Medications: Verified on Patient Summary List   The Plan of Care and following information is based on the information from the initial evaluation.   =====================================================================================  Assessment / key information:    Pt is a 76 y.o. F referred to [facility] for tx of urinary frequency, bladder pain, and nocturia. Signs and sx are consistent with dx. Pt presents primarily with urinary leakage brought on by urinary urgency/frequency. She reports voiding 1-2x per day, and 4 times or more per hour at night. Leakage is reported in large amounts. Pt also reports bladder pain and abdominal pain as well as constipation. Her condition is further complicated by hx of multiple abdominal surgeries, childbirth, hysterectomy, and stroke. Pt will be scored on PERF and internal exam performed next visit due to time constraint with pt involved PMH. Pt demonstrated dec global core and hip strength as indicated in objective measures which is also likely contributing to pelvic floor dysfunction. Patient scored 72 on FOTO indicating 28 % functional impairment.   Patient can benefit from PT to increase pelvic floor muscle strength, decrease urinary urgency and incontinence, as well as improve hip/core strength, ROM, and dynamic stability for improved functional mobility and QOL. Treatment may include, but is not limited to, therapeutic exercise, therapeutic activities, neuromuscular re-education, manual therapy, and modalities as indicated including biofeedback at therapist discretion. Subjective: Pt reports that she had hernia surgery in 2015 which resulted in pain along her bikini line and near her ovary. She reports she saw her MD every 2-3 months after the surgery due to the pain. She has had both upper and lower GI/colonoscopy, as well as pelvic exams without any answer as to what is causing the pain. She reports the hernia repair used mesh and her doctors told her it is stuck to scar tissue. She had her ovaries checked in June with ultrasound, her ovaries looked fine, but the wand recreated the pain when they pushed to the side. She has also had x rays to rule out the back and hip. She has been using lidocaine patches, hydrocodone, heat, and has to lay on her R side. She is also having frequent urination and urinary leakage. She feels the hydrocodone is making her constipated but she is trying to stop taking them but she also needs them to address the pain in her stomach and knee and back as it is impossible for her to stand if she doesn't take pain medication. Seafood, spicy food, or salty foods will trigger the urination. She was checked for a UTI but has not heard the results yet. She does not urinate during the day 1-2 time, and at night 4 or more times per hour. She will lose bladder control if she does not go to the bathroom in time, she also loses urine with laughing and coughing/sneezing. She is up all night going to the bathroom. The frequent urination is also painful along with the scar tissue on the L side of her abdomen.  It is only painful at night when she lays down, so she has been sleeping in her chair. She also complains of significant constipation lately she thinks due to taking medication for the pain in her stomach. She has tried metamucil to address the constipation and drinks it with a large container of water per night but is still constipated. She has had some hardened fecal matter She googled her symptoms and google told her the pain in her side is due to constipation. She has a history of getting diarrhea when she eats baked chicken, leafy greens, and ice cream. She has tried to eat these and they haven't fixed the constipation. PMH relevant for kidney stone, tummy tuck, partial hysterectomy, hernia repair, stroke, JUSTO TKA      Objective:    Current urinary complaint  urinary frequency  urinary urgency  dysuria: pain during and after void  urge incontinence  nocturia  dysfunctional voiding  constipation    Bladder complaint longevity:  1 - 3 years    Bladder symptom progression:  worsened    Frequency of UI: weekly to 1x per month depending on activity/laughter    Pad use:  none    Daytime urinary frequency:1-2x per day    Nocturia:  4+ x per hour per night    Patient has failed previous pelvic floor muscle training? [] Yes    [x] No    Pelvic floor MMT: Internal exam deferred until next visit. Pain complaint:  Location:bladder pain  suprapubic pain/discomfort  lower abdomen  LLQ    Pain scale:  Verbal Analog scale: average daily pain 5, best day 5, worst pain 7    Pain description:  daily: constant  worsens with: laying on L side, night time  improves with: pain patches, medication, heat    Pelvic floor manual exam: Time constraint, will test NV    FOTO= 72/100     Global Core Strength/stability  Hip ROM: ext L=10 deg, R=5 deg,   Hip strength: flexion L=3/5p!, R=3/5, ext JUSTO 3/5, abd  L=3/5 p!, R=3+/5  Transverse abdominus activation: fair with dec endurance.  Abdominal bulging noted with head lift  Functional squat: mild R lateral shift at depth, genu valgus noted JUSTO L>R with SLS squat  SLS: L= ~4s with contralateral hip drop and LE IR, R=8s  TTP: bladder, abdominals at midline near hernia repair, LLQ, L hip flexor, L psoas, L TFL      ==================================================================================================================  Evaluation Complexity History HIGH Complexity :3+ comorbidities / personal factors will impact the outcome/ POC ; Examination MEDIUM Complexity : 3 Standardized tests and measures addressing body structure, function, activity limitation and / or participation in recreation  ;Presentation MEDIUM Complexity : Evolving with changing characteristics  ; Clinical Decision Making MEDIUM Complexity : FOTO score of 26-74  Overall Complexity Rating: MEDIUM    Problem List: Pelvic pain/dysfunction, Decreased pelvic floor mm awareness, Decreased pelvic floor mm strength, Use of accessory muscles, Improper voiding habits, Hypertonus of pelvic floor, Urinary urgency and Otherpain affecting function, decrease ROM, decrease strength, edema affecting function, impaired gait/ balance, decrease ADL/ functional abilitiies, decrease activity tolerance, decrease flexibility/ joint mobility, decrease transfer abilities and other constipation     Treatment Plan may include any combination of the following:   Therapeutic exercise, Urge suppression techniques, Neuromuscular re-education, Manual therapy, Physical agent/modality and Patient educationTherapeutic exercise, Therapeutic activities, Neuromuscular re-education, Physical agent/modality, Gait/balance training, Manual therapy, Aquatic therapy, Patient education, Self Care training, Functional mobility training, Home safety training and Stair training    Patient / Family readiness to learn indicated by: asking questions  Persons(s) to be included in education: patient (P)  Barriers to Learning/Limitations: None  Measures taken, if barriers to learning:    Patient Goal (s): \"to find source of pain\"   Patient self reported health status: good  Rehabilitation Potential: good    Short Term Goals: To be accomplished in 1 weeks:  1) Goal:  Patient intake of H20 at ~105 oz to address constipation  Status at last note/certification: 42-31PU  Long Term Goals: To be accomplished in 8-12 treatments:  1) Goal: Patient will perform self tissue mobilization daily to address scar adhesions and tender mm tissue  Status at last note/certification: na  2) Goal: Patient independent in HEP  Status at last note/certification:na  3) Goal: Patient will report 50% improvement in urinary incontinence  Status at last note/certification: na  4) Goal: Patient will report urinating 2x or less per night for sufficient rest  Status at last note/certification: 4+ times per hour    Frequency / Duration:   Patient to be seen  1  times per week for 16  treatments:  Patient / Caregiver education and instruction: self care  Therapist Signature: Waldo Waterman Date: 1/42/7427   Certification Period: 8/17/21-11/15/21 Time: 12:21 PM     ===========================================================================================  I certify that the above Physical Therapy Services are being furnished while the patient is under my care. I agree with the treatment plan and certify that this therapy is necessary. Physician Signature:        Date:       Time:        John Alamo MD  Please sign and return to [facility] at [facility] G[facility] or you may fax the signed copy to [phone]. Thank you.

## 2021-08-31 ENCOUNTER — HOSPITAL ENCOUNTER (OUTPATIENT)
Dept: PHYSICAL THERAPY | Age: 66
Discharge: HOME | End: 2021-08-31
Payer: MEDICARE

## 2021-08-31 PROCEDURE — 97535 SELF CARE MNGMENT TRAINING: CPT

## 2021-08-31 PROCEDURE — 97140 MANUAL THERAPY 1/> REGIONS: CPT

## 2021-08-31 PROCEDURE — 97530 THERAPEUTIC ACTIVITIES: CPT

## 2021-08-31 NOTE — PROGRESS NOTES
PT DAILY TREATMENT NOTE    Patient Name: Emma Mazariegos  Date:2021  : 1955  [x]  Patient  Verified  Payor: Oswaldo Milling / Plan: [address] [place] [address] / Product Type: Managed Care Medicare /    In time:1148a  Out time:1235p  Total Treatment Time (min): 47  Total Timed Codes (min): 42  1:1 Treatment Time ([facility] only): 42 (47- 5 mins dressing)  Visit #: 2 of 16    Treatment Area: Urinary frequency [R35.0]  Bladder pain [R39.89]  Nocturia [R35.1]    SUBJECTIVE  Pain Level (0-10 scale): 0/10  Any medication changes, allergies to medications, adverse drug reactions, diagnosis change, or new procedure performed?: [x] No    [] Yes (see summary sheet for update)  Subjective functional status/changes:   [] No changes reported  Patient reports she tried some metamucil, but does not like it, ate dark green leafy vegetables,and drank prune juice instead, some diet tea, and she also saw her MD who recommended milk of magnesia and colace. She has been trying to not take pain pills, using patches and heat packs instead. She was not able to get in all her water. She drinks 2 big bottles of water at night. (32 oz) She has also been trying to get rid of the irritating foods in her diet. She has a hard time sleeping, and slept sitting up in her chair. She does a lot of reading and is in 2 book clubs. She is prepared to do the internal exam today. OBJECTIVE    24 min Self Care: Thorough review and handout provided on the following: kegels and bladder fitness,    Rationale:  Increase awareness and understanding of current condition to improve patients ability to independently and effectively attain goals and progress towards long term management of current condition.      10 min Therapeutic Activity:  [x]  See flow sheet : instruction in use of pelvic wand   Rationale: Decrease resting tone of the pelvic floor and Increase tissue extensibility of the pelvic floor muscles in order to Improve frequency and ease of bowel movements and Improve ability to engage in sexual intercourse. 8 min Manual Therapy:  stm and trigger point release to L obturator internus   Rationale: decrease pain and increase tissue extensibility in order to Improve frequency and ease of bowel movements and dec pelvic and abdominal pain for improved QOL. With   [] TE   [] TA   [] neuro   [] other: Patient Education: [x] Review HEP    [] Progressed/Changed HEP based on:   [] positioning   [] body mechanics   [] transfers   [] heat/ice application    [] other:      Other Objective/Functional Measures:      Pelvic floor MMT  3 - good contraction, 3-5 seconds  PERF (Performance/Endurance/Repetitions//Flicks): 6/1/3/5    Pelvic floor manual exam: Performed via internal vaginal assessment  female-upon vaginal palpation of the pelvic floor, the following was noted: left sided anterior lateral hypertonicity with painful palpation and posterior pelvic floor painful palpation of coccyx/ coccygeus   With palpation of L obturator internus pt noted reproduction of abdominal pain    Deep PFM Tenderness/Restriction:    Right Left   pubococcygeus     Ischiococcygeus  moderate   Iliococcygeus  moderate   Obturator Internus  severe   coccyx              Pain Level (0-10 scale) post treatment: 0/10    ASSESSMENT/Changes in Function:   Patient tolerated today's session well with no c/o pain. Patient demonstrated understanding of today's instruction, education, and recommendations. Pt consented to internal exam today with findings as noted above. She was educated in [place] and how to perform correctly, as well as in the relationship between the bladder/bowels and pelvic floor. Pt was also educated in pelvic wand and use of with visual demonstration of how to use wand, and verbal instruction in use at home with care to proceed carefully and gently to avoid further exacerbation of pain. Pt was also encouraged to inc water intake.      Patient will continue to benefit from skilled PT services to modify and progress therapeutic interventions, address functional mobility deficits, address ROM deficits, address strength deficits, analyze and address soft tissue restrictions, analyze and cue movement patterns, analyze and modify body mechanics/ergonomics, assess and modify postural abnormalities, address imbalance/dizziness and instruct in home and community integration to attain remaining goals. [x]  See Plan of Care  []  See progress note/recertification  []  See Discharge Summary         Progress towards goals / Updated goals:  Short Term Goals: To be accomplished in 1 weeks:  1) Goal:  Patient intake of H20 at ~105 oz to address constipation  Status at last note/certification: 67-85ON 8/31/21 pt reports 32 oz typically at night   Long Term Goals:  To be accomplished in 8-12 treatments:  1) Goal: Patient will perform self tissue mobilization daily to address scar adhesions and tender mm tissue  Status at last note/certification: na  2) Goal: Patient independent in HEP  Status at last note/certification:na  3) Goal: Patient will report 50% improvement in urinary incontinence  Status at last note/certification: na  4) Goal: Patient will report urinating 2x or less per night for sufficient rest  Status at last note/certification: 4+ times per hour    PLAN  []  Upgrade activities as tolerated     [x]  Continue plan of care  [x]  Update interventions per flow sheet       []  Discharge due to:_  []  Other:Yvette Longoria 8/31/2021  9:49 AM    Future Appointments   Date Time Provider Lalo Espino   8/31/2021 11:45 AM Pervis Punter [facility]   9/7/2021  1:30 PM Sybil Garcia [facility]   9/10/2021 10:00 AM Shakir Whitehead NP [facility]   9/14/2021 11:45 AM Pervis Punter [facility]   9/21/2021  1:30 PM Pervis Punter [facility]

## 2021-09-14 ENCOUNTER — HOSPITAL ENCOUNTER (OUTPATIENT)
Dept: PHYSICAL THERAPY | Age: 66
Discharge: HOME | End: 2021-09-14
Payer: MEDICARE

## 2021-09-14 PROCEDURE — 97530 THERAPEUTIC ACTIVITIES: CPT

## 2021-09-14 PROCEDURE — 97140 MANUAL THERAPY 1/> REGIONS: CPT

## 2021-09-14 PROCEDURE — 97535 SELF CARE MNGMENT TRAINING: CPT

## 2021-09-14 PROCEDURE — 97110 THERAPEUTIC EXERCISES: CPT

## 2021-09-14 NOTE — PROGRESS NOTES
PT DAILY TREATMENT NOTE    Patient Name: Esther Patton  Date:2021  : 1955  [x]  Patient  Verified  Payor: Cristopher [place] / [facility] / Product Type: Managed Care Medicare /    In BNJN:[id number]  Out time:1240  Total Treatment Time (min): 45  Total Timed Codes (min): 45  1:1 Treatment Time ([facility] only): 45  Visit #: 3 of 16    Treatment Area: Urinary frequency [R35.0]  Bladder pain [R39.89]  Nocturia [R35.1]    SUBJECTIVE  Pain Level (0-10 scale): 0/10  Any medication changes, allergies to medications, adverse drug reactions, diagnosis change, or new procedure performed?: [x] No    [] Yes (see summary sheet for update)  Subjective functional status/changes:   [] No changes reported  Patient reports she has had days where she didn't need to take pain medication or use patches but the next day it hits her like BAM. She has taken metamucil and taken some stool softeners. The consistency is getting softer with the stool softeners. She tried taking the metamucil with hot water per her daughters recommendation and it burnt her mouth. The supplements make her stomach rumble and make her pass gas. She hasn't been messing with the water. She stopped drinking lemonade and cranberry juice, but needs a hot cup of tea in the morning. OBJECTIVE    25 min Self Care: Thorough review and handout provided on the following: water goals, importance of drinking enough water with fiber intake. Education about bladder irritants. HEP updated   Rationale:  Increase awareness and understanding of current condition to improve patients ability to independently and effectively attain goals and progress towards long term management of current condition.      10 min Therapeutic Activity:  [x]  See flow sheet : instruction in use of pelvic wand, HEP updated    Rationale: Decrease resting tone of the pelvic floor and Increase tissue extensibility of the pelvic floor muscles in order to Improve frequency and ease of bowel movements and Improve ability to engage in sexual intercourse. 10 min Manual Therapy:  stm to L obliques, L psoas release   Rationale: decrease pain and increase tissue extensibility in order to Improve frequency and ease of bowel movements and dec pelvic and abdominal pain for improved QOL. With   [] TE   [] TA   [] neuro   [] other: Patient Education: [x] Review HEP    [] Progressed/Changed HEP based on:   [] positioning   [] body mechanics   [] transfers   [] heat/ice application    [] other:      Other Objective/Functional Measures:    See PN    Pain Level (0-10 scale) post treatment: 0/10    ASSESSMENT/Changes in Function:   Pt tolerated session well without complaint of pain, pt and PT discussed importance of H2O intake with supplemental fiber to aid in addressing constipation as insufficient H2O intake can result in inc constipation. Pt was educated again as to daily water goal of 1/2 pt body weight in oz ( I.e. if you weigh 200 lbs you need to drink 100 oz). Pt reported drinking 2 bottles of water a day, typically at night. Pt was again educated to avoid consuming inc water volumes at night as this is likely contributing to nocturia, instead drinking inc h20 in the morning, and spreading it out throughout the day. Pt was given hand out of CMT pelvic wand per pt request as she preferred the aesthetic of it. Patient will continue to benefit from skilled PT services to modify and progress therapeutic interventions, address functional mobility deficits, address ROM deficits, address strength deficits, analyze and address soft tissue restrictions, analyze and cue movement patterns, analyze and modify body mechanics/ergonomics, assess and modify postural abnormalities, address imbalance/dizziness and instruct in home and community integration to attain remaining goals.      [x]  See Plan of Care  []  See progress note/recertification  []  See Discharge Summary         Progress towards goals / Updated goals:  See PN  PLAN  []  Upgrade activities as tolerated     [x]  Continue plan of care  [x]  Update interventions per flow sheet       []  Discharge due to:_  []  Other:_      Pardeep Mars 9/14/2021  9:49 AM    Future Appointments   Date Time Provider Lalo Espino   9/14/2021 11:45 AM Parish Oshea [facility]   9/21/2021  1:30 PM Sana Kennedy [facility]   11/19/2021  2:00 PM HERVE Hernandez

## 2021-09-14 NOTE — PROGRESS NOTES
[facility] PHYSICAL THERAPY  [address], [place], [address] - Phone: [phone]  Fax: [phone]  PROGRESS NOTE  Patient Name: Margret Gilbert : 1955   Treatment/Medical Diagnosis: Urinary frequency [R35.0]  Bladder pain [R39.89]  Nocturia [R35.1]   Referral Source: Keesha Bhatti MD     Date of Initial Visit: 21 Attended Visits: 3 Missed Visits: 1     SUMMARY OF TREATMENT  Pt is a 76 y.o. F referred for tx of urinary frequency, bladder pain, and nocturia. Skilled care has consisted of therapeutic exercise, therapeutic activities, neuromuscular re education, manual therapy, and modalities as indicated to address impairments. CURRENT STATUS  Pt reports 5% overall improvement in sx since beginning care. Pt reports noting inc duration of lowered pain periods and improving stool consistency. Pt improvement at this point is minimal likely due to small number of visits to skilled care (3 visits total). Pt progress is likely also limited due to limited adherence to HEP, bladder irritant diet, and H2O recommendations.  However, pt will likely benefit from skilled care to address impairments listed below.      Functional Gains: decreasing pain( pt reports inc length of lowered pain periods), improving stool consistency  Functional Deficits: continued pelvic and abdominal pain, constipation, urinary leakage  % improvement: 5%  Pain   Average: 5/10                  Best: 5/10                Worst:7 /10  Patient Goal: \"get this pain gone\"     Objective:     Current urinary complaint  urinary frequency  urinary urgency  dysuria: pain during and after void  urge incontinence  nocturia  dysfunctional voiding  constipation     Bladder complaint longevity:  1 - 3 years     Bladder symptom progression:   Slowly improving     Frequency of UI: weekly to 1x per month depending on activity/laughter     Pad use:  none     Daytime urinary frequency:1-2x per day     Nocturia:  4+ x per hour per night     Pain complaint:  Location:bladder pain  suprapubic pain/discomfort  lower abdomen  LLQ     Pain scale:  Verbal Analog scale: average daily pain 5, best day 5, worst pain 7     Pain description:  daily: constant- pt reports pain duration bouts are decreasing ( inc lowered pain times)  worsens with: laying on L side, night time  improves with: pain patches, medication, heat     Pelvic floor manual exam: From 8/31/21     Pelvic floor MMT  3 - good contraction, 3-5 seconds  PERF (Performance/Endurance/Repetitions//Flicks): 3/8/9/9    Pelvic floor manual exam: Performed via internal vaginal assessment  female-upon vaginal palpation of the pelvic floor, the following was noted: left sided anterior lateral hypertonicity with painful palpation and posterior pelvic floor painful palpation of coccyx/ coccygeus   With palpation of L obturator internus pt noted reproduction of abdominal pain    Deep PFM Tenderness/Restriction:    Right Left   pubococcygeus     Ischiococcygeus  moderate   Iliococcygeus  moderate   Obturator Internus  severe   coccyx            FOTO= 69/100 (-3 points)     Global Core Strength/stability  Hip ROM: ext L=10 deg, R=5 deg,   Hip strength: flexion L=3/5p!, R=3/5, ext JUSTO 3/5, abd  L=3/5 p!, R=3+/5  Transverse abdominus activation: fair with dec endurance. Abdominal bulging noted with head lift  Functional squat: mild R lateral shift at depth, genu valgus noted JUSTO L>R with SLS squat  SLS: L= ~4s with contralateral hip drop and LE IR, R=8s  TTP: bladder, abdominals at midline near hernia repair, LLQ, L hip flexor, L psoas, L TFL         Goal/Measure of Progress Goal Met? 1.    Patient intake of H20 at ~105 oz to address constipation   Status at last Eval: 24-40oz Current Status: 32 oz no     Goal/Measure of Progress Goal Met? 1.   Patient will perform self tissue mobilization daily to address scar adhesions and tender mm tissue   Status at last Eval: na Current Status: Na- pt has not yet purchased pelvic wand no   2. Patient independent in HEP   Status at last Eval: na Current Status: Issued 8/13/21, updated 9/14/21, min to mod compliance no   3. Patient will report 50% improvement in urinary incontinence   Status at last Eval: na Current Status: 5% no     Goal/Measure of Progress Goal Met? 4. Patient will report urinating 2x or less per night for sufficient rest   Status at last Eval: 4+ times per hour Current Status: 4+ times per hour no         New Goals to be achieved in __13__  treatments: continue with goals as established on initial eval  Short Term Goals: To be accomplished in 1 weeks:  1) Goal:  Patient intake of H20 at ~105 oz to address constipation  Status at last note/certification: 24-40oz 8/31/21 pt reports 32 oz typically at night   Long Term Goals: To be accomplished in 8-12 treatments:  1) Goal: Patient will perform self tissue mobilization daily to address scar adhesions and tender mm tissue  Status at last note/certification: na  2) Goal: Patient independent in HEP  Status at last note/certification:na  3) Goal: Patient will report 50% improvement in urinary incontinence  Status at last note/certification: na  4) Goal: Patient will report urinating 2x or less per night for sufficient rest  Status at last note/certification: 4+ times per hour     RECOMMENDATIONS  Continue per POC for 1x per week for 13 treatments remaining from initial POC to address impairments in order to return pt to PLOF and improve QOL    If you have any questions/comments please contact us directly at [phone]. Thank you for allowing us to assist in the care of your patient.     Therapist Signature: Joanna Michael Date: 3/16/0593   Certification period  Reporting period 8/17/21-11/15/21  8/17/21-9/14/21 Time: 2:12 PM   NOTE TO PHYSICIAN:  PLEASE COMPLETE THE ORDERS BELOW AND FAX TO   [facility] Physical [facility]: [phone]  If you are unable to process this request in 24 hours please contact our office: [phone]    ___ I have read the above report and request that my patient continue as recommended.   ___ I have read the above report and request that my patient continue therapy with the following changes/special instructions:_________________________________________________________   ___ I have read the above report and request that my patient be discharged from therapy.      Physician Signature:        Date:       Time:       Thuy Almazan MD

## 2021-09-21 ENCOUNTER — HOSPITAL ENCOUNTER (OUTPATIENT)
Dept: PHYSICAL THERAPY | Age: 66
Discharge: HOME | End: 2021-09-21
Payer: MEDICARE

## 2021-09-21 PROCEDURE — 97535 SELF CARE MNGMENT TRAINING: CPT

## 2021-09-21 PROCEDURE — 97530 THERAPEUTIC ACTIVITIES: CPT

## 2021-09-21 PROCEDURE — 97140 MANUAL THERAPY 1/> REGIONS: CPT

## 2021-09-21 NOTE — PROGRESS NOTES
PT DAILY TREATMENT NOTE    Patient Name: Chapincito Metcalf  Date:2021  : 1955  [x]  Patient  Verified  Payor: Clark Spear / Plan: [employer] / Product Type: Managed Care Medicare /    In time:1:31 p Out time:215p  Total Treatment Time (min): 44  Total Timed Codes (min): 44  1:1 Treatment Time ( [address] only): 44  Visit #: 4 of 16    Treatment Area: Urinary frequency [R35.0]  Bladder pain [R39.89]  Nocturia [R35.1]    SUBJECTIVE  Pain Level (0-10 scale): 0/10  Any medication changes, allergies to medications, adverse drug reactions, diagnosis change, or new procedure performed?: [x] No    [] Yes (see summary sheet for update)  Subjective functional status/changes:   [] No changes reported  Patient reports the pain came back today so she had to take 1/2 oxycodone. She is happy that the pain is not every day anymore. She is no longer drinking tea or lemonaid. She drinks two jugs of water at night, but not during the day. She had a bowel movement but it was small and didn't seem to clear out her bowels. She passed a lot of gas but not stool. OBJECTIVE    24 min Self Care: Thorough discussion and education in sufficient H20 intake in regards to improving stool motility especially with supplemental fiber. Repeated education of how fiber absorbs water, and with insufficient water intake will lead to constipation, as well as prescription pain medication side effect is constipation. Rationale:  Increase awareness and understanding of current condition to improve patients ability to independently and effectively attain goals and progress towards long term management of current condition.      10 min Therapeutic Activity:  [x]  See flow sheet : brooke, FLAKITA, SKTC for improved bed mobility and inc relaxation of pelvic floor   Rationale: Decrease resting tone of the pelvic floor and Increase tissue extensibility of the pelvic floor muscles in order to Improve frequency and ease of bowel movements and Improve ability to engage in sexual intercourse. 10 min Manual Therapy:  SWATHI massage   Rationale: decrease pain and increase tissue extensibility in order to Improve frequency and ease of bowel movements and dec pelvic and abdominal pain for improved QOL. With   [] TE   [] TA   [] neuro   [] other: Patient Education: [x] Review HEP    [] Progressed/Changed HEP based on:   [] positioning   [] body mechanics   [] transfers   [] heat/ice application    [] other:      Other Objective/Functional Measures: Added LTR and SWATHI massage    Pain Level (0-10 scale) post treatment: 0/10    ASSESSMENT/Changes in Function:   Pt was instructed in SWATHI massage today to stimulate intestinal motility and facilitate bowel movement. Pt was also again instructed in inc water intake and earlier in the day to aid with improving stool consistency and frequency. Pt continues to limit water intake to late at night and insufficient amounts. Pt was also instructed to implement walking daily to utilize movement and gravity to aid in bowl function. Pt was able to pass gas with SWATHI massage and pt was educated that this was a good sign as it indicates inc intestinal activity. Patient will continue to benefit from skilled PT services to modify and progress therapeutic interventions, address functional mobility deficits, address ROM deficits, address strength deficits, analyze and address soft tissue restrictions, analyze and cue movement patterns, analyze and modify body mechanics/ergonomics, assess and modify postural abnormalities, address imbalance/dizziness and instruct in home and community integration to attain remaining goals. [x]  See Plan of Care  []  See progress note/recertification  []  See Discharge Summary         Progress towards goals / Updated goals:     Short Term Goals:  To be accomplished in 1 weeks:  1) Goal:  Patient intake of H20 at ~105 oz to address constipation  Status at last note/certification: 24-40oz 8/31/21 pt reports 32 oz typically at night   Long Term Goals:  To be accomplished in 8-12 treatments:  1) Goal: Patient will perform self tissue mobilization daily to address scar adhesions and tender mm tissue  Status at last note/certification: na  2) Goal: Patient independent in HEP  Status at last note/certification:na  3) Goal: Patient will report 50% improvement in urinary incontinence  Status at last note/certification: na  4) Goal: Patient will report urinating 2x or less per night for sufficient rest  Status at last note/certification: 4+ times per hour  PLAN  []  Upgrade activities as tolerated     [x]  Continue plan of care  [x]  Update interventions per flow sheet       []  Discharge due to:_  []  Other:_      Carman Mcardle 9/21/2021  9:49 AM    Future Appointments   Date Time Provider Lalo Espino   9/21/2021  1:30 PM Lina Kennedy[facility]   11/19/2021  2:00 PM Lisandro Carias NP [facility]

## 2021-10-12 ENCOUNTER — HOSPITAL ENCOUNTER (OUTPATIENT)
Dept: PHYSICAL THERAPY | Age: 66
Discharge: HOME | End: 2021-10-12
Payer: MEDICARE

## 2021-10-12 PROCEDURE — 97535 SELF CARE MNGMENT TRAINING: CPT

## 2021-10-12 PROCEDURE — 97140 MANUAL THERAPY 1/> REGIONS: CPT

## 2021-10-12 PROCEDURE — 97530 THERAPEUTIC ACTIVITIES: CPT

## 2021-10-12 NOTE — PROGRESS NOTES
PT DAILY TREATMENT NOTE    Patient Name: Kaylie Singleton  Date:10/12/2021  : 1955  [x]  Patient  Verified  Payor: Javier Mayra / Plan: [place] / Product Type: Managed Care Medicare /    In time:11:47a Out time:1235p  Total Treatment Time (min): 48  Total Timed Codes (min): 48  1:1 Treatment Time ([facility] only): 48  Visit #: 5 of 16    Treatment Area: Urinary frequency [R35.0]  Bladder pain [R39.89]  Nocturia [R35.1]    SUBJECTIVE  Pain Level (0-10 scale): 0-3/10  Any medication changes, allergies to medications, adverse drug reactions, diagnosis change, or new procedure performed?: [x] No    [] Yes (see summary sheet for update)  Subjective functional status/changes:   [] No changes reported  Patient reports she was pain free for 3 days, then the pain came back with a vengeance and she had to take an opiate pain pill. She has not been drinking much water lately, but she has been taking her stool softeners. OBJECTIVE    23 min Self Care: Repeated importance of drinking sufficient water. PT did math for pt for hourly water goal (216 lbs/2= 108 oz/ 12 hours= 9 oz per hour) and gave pt bladder diary to aid in compliance with water intake, and to track sx. Pt was also instructed in endurance kegels, and \"the knack\" to address incontinence with laughter   Rationale:  Increase awareness and understanding of current condition to improve patients ability to independently and effectively attain goals and progress towards long term management of current condition. 15 min Therapeutic Activity:  [x]  See flow sheet : FLAKITA cox, seated marching for improved bed mobility and transfers   Rationale: Decrease resting tone of the pelvic floor and Increase tissue extensibility of the pelvic floor muscles in order to Improve frequency and ease of bowel movements and Improve ability to engage in sexual intercourse.     10 min Manual Therapy:  SWATHI massage   Rationale: decrease pain and increase tissue extensibility in order to Improve frequency and ease of bowel movements and dec pelvic and abdominal pain for improved QOL. With   [] TE   [] TA   [] neuro   [] other: Patient Education: [x] Review HEP    [] Progressed/Changed HEP based on:   [] positioning   [] body mechanics   [] transfers   [] heat/ice application    [] other:      Other Objective/Functional Measures:    See PN    Pain Level (0-10 scale) post treatment: 0/10    ASSESSMENT/Changes in Function:   Pt was again instructed in SWATHI massage to aid in stimulating intestinal motility to facilitate bowel movements. Several minutes were spent discussing the importance of adequate hydration not only for bowel function, but for bladder function as well as other body systems ( skin, joints, blood flow etc). Pt verbalized intention to try to inc h2o intake. Pt was also instructed in \"the knack\" in order to address incontinence with laughter with pt requiring vc to properly activate pelvic floor prior to cough/laugh to improve reflexive activation of pelvic floor to prevent incontinence. Patient will continue to benefit from skilled PT services to modify and progress therapeutic interventions, address functional mobility deficits, address ROM deficits, address strength deficits, analyze and address soft tissue restrictions, analyze and cue movement patterns, analyze and modify body mechanics/ergonomics, assess and modify postural abnormalities, address imbalance/dizziness and instruct in home and community integration to attain remaining goals.      [x]  See Plan of Care  []  See progress note/recertification  []  See Discharge Summary         Progress towards goals / Updated goals:   See PN    PLAN  []  Upgrade activities as tolerated     [x]  Continue plan of care  [x]  Update interventions per flow sheet       []  Discharge due to:_  []  Other:Yvette Keane 10/12/2021  9:49 AM    Future Appointments   Date Time Provider Department [place]   10/12/2021 11:45 AM Hershal Haralson [facility]   10/19/2021 10:15 AM Hershal Haralson [facility]   10/26/2021 11:00 AM Hershal Haralson [facility]   11/2/2021 11:45 AM Hershal Haralson [facility]   11/9/2021 11:45 AM Hershal Haralson [facility]   11/16/2021 11:00 AM [facility] PT CHILLED POND 2 [facility]   11/19/2021  2:00 PM Alesha Smallwood NP [facility]   11/23/2021 11:45 AM Hersjerrica Haralson [facility]   11/30/2021 11:45 AM Hershal Haralson [facility]

## 2021-10-12 NOTE — PROGRESS NOTES
[facility] PHYSICAL THERAPY  [address], [place], [address] - Phone: [phone]  Fax: [phone]  CONTINUED PLAN OF CARE/RECERTIFICATION FOR PHYSICAL THERAPY          Patient Name: Odilon Perez : 1955   Treatment/Medical Diagnosis: Urinary frequency [R35.0]  Bladder pain [R39.89]  Nocturia [R35.1]   Onset Date: ~    Referral Source: Audelia Whiteside MD Start of Care [facility] 2021   Prior Hospitalization: See Medical History Provider #: [id number]   Prior Level of Function: No hx of urine problem prior to    Comorbidities:  PMH relevant for kidney stone, tummy tuck, partial hysterectomy, hernia repair, stroke, JUSTO TKA, hx of fibroids   Medications: Verified on Patient Summary List   Vis[facility] from [facility]: 5 Missed Visits: 1     Goal/Measure of Progress Goal Met? 1.    Patient intake of H20 at ~105 oz to address constipation    Status at last Eval: 320z Current Status: 32 oz or less No, recent regression in H20 intake      Goal/Measure of Progress Goal Met? 1. Patient will perform self tissue mobilization daily to address scar adhesions and tender mm tissue    Status at last Eval: Na- pt has not yet purchased pelvic wand Current Status: Na- pt has not yet purchased pelvic wand NA   2. Patient independent in HEP    Status at last Eval: Issued 21, updated 21, min to mod compliance Current Status: Issued 21, updated 21, min compliance noted, reviewed today no   3. Patient will report 50% improvement in urinary incontinence    Status at last Eval: 5% Current Status: 20% no      Goal/Measure of Progress Goal Met? 4.   Patient will report urinating 2x or less per night for sufficient rest    Status at last Eval: 4+ times per hour Current Status: 1-2x per night yes           Key Functional Changes/Progress: Pt has attended initial eval and 3 follow up visits since 21, lapses in care are due to pt cancelling when only seen at rate of 1x per week, and on one occasion therapist cancelling. Pt progress at this point is slow due to pt difficulty adhering to recommended water and fiber intake as well as HEP. Pt is making progress with abdominal pain reporting 3 day instances of no pain, as well as improvements with stool consistency, and dec nocturia. Pt continues to have pelvic and abdominal pain, constipation, feeling of incomplete bowel movements, and urinary leakage with laughing. She is making slow progress toward all goals, and will likely continue to progress with skilled care. Skilled care is warranted to continue in order to improve pt QOL and reach all LTG.          Functional Gains: periods of decreasing pain( pt reports inc length of lowered pain periods), pt reports 3 days of no pain, taking less pain medication, improving stool consistency, improving nocturia,   Functional Deficits: continued pelvic and abdominal pain, constipation, feeling of incomplete bowel movements,  urinary leakage  % improvement: 20%  Pain   Average: 5/10                  Best: 0/10                Worst: 7 /10  Patient Goal: \"no pain\"      Objective:     Current urinary complaint  urinary frequency  urinary urgency  dysuria: pain during and after void  urge incontinence  Nocturia (improving)  dysfunctional voiding  constipation     Bladder complaint longevity:  1 - 3 years     Bladder symptom progression:                 Slowly improving     Frequency of UI: weekly to 1x per month depending on activity/laughter     Pad use:  none     Daytime urinary frequency:1-2x per day     Nocturia:  1-2x per night (improving in comparison 4+ times per hour per night)     Pain complaint:  Location:bladder pain  suprapubic pain/discomfort  lower abdomen  LLQ     Pain scale:  Verbal Analog scale: average daily pain 5, best day 0, worst pain 7     Pain description:  daily: constant- pt reports pain duration bouts are decreasing ( inc lowered pain times)  worsens with: laying on L side, night time  improves with: pain patches, medication, heat     Pelvic floor manual exam: Not tested today 10/12/21     Pelvic floor MMT  3 - good contraction, 3-5 seconds  PERF (Performance/Endurance/Repetitions//Flicks): 2/1/2/83 per pt report     Pelvic floor manual exam: not tested today 10/12/21     FOTO= 66/100 (-6 points since [facility]     Global Core Strength/stability  Hip ROM: ext L=10 deg, R=5 deg,   Hip strength: flexion L=3/5( mild pain), R=3/5, ext JUSTO 3/5, abd  L=3+/5 p!, R=4-/5  Transverse abdominus activation: fair+ with dec endurance.  Abdominal bulging noted with head lift  Functional squat: NT today  SLS: NT today  TTP: bladder, abdominals at midline near hernia repair, LLQ, L hip flexor, L psoas, L TFL    Problem List: Pelvic pain/dysfunction, Decreased pelvic floor mm awareness, Decreased pelvic floor mm strength, Use of accessory muscles, Improper voiding habits, Hypertonus of pelvic floor, Urinary urgency and Otherpain affecting function, decrease ROM, decrease strength, edema affecting function, impaired gait/ balance, decrease ADL/ functional abilitiies, decrease activity tolerance, decrease flexibility/ joint mobility, decrease transfer abilities and other constipation           Treatment Plan may include any combination of the following:   Therapeutic exercise, Urge suppression techniques, Neuromuscular re-education, Manual therapy, Physical agent/modality and Patient educationTherapeutic exercise, Therapeutic activities, Neuromuscular re-education, Physical agent/modality, Gait/balance training, Manual therapy, Aquatic therapy, Patient education, Self Care training, Functional mobility training, Home safety training and Stair training    Patient Goal(s) has been updated and includes:  \"no pain\"    Goals for this certification period include and are to be achieved in   8  treatments: continue toward unmet goals    1) Goal:  Patient intake of H20 at ~105 oz to address constipation  Status at last note/certification: 32 oz or less   2) Goal: Patient will perform self tissue mobilization daily to address scar adhesions and tender mm tissue  Status at last note/certification: na, pt has not purchase pelvic wand  3) Goal: Patient independent in HEP  Status at last note/certification:Issued 8/13/21, updated 9/14/21, min compliance noted, reviewed today  4) Goal: Patient will report 50% improvement in overall function  Status at last note/certification: 03%  5) Goal: Patient will report worst pain at 4/10 for improved QOL  Status at last note/certification: 5/68    Frequency / Duration:   Patient to be seen   1   times per week for   8    treatments:    Assessments/Recommendations:   Continue per POC for 1x per week for 8 treatments to address impairments in order to return pt to PLOF and improve QOL     If you have any questions/comments please contact us directly at [phone]. Thank you for allowing us to assist in the care of your patient. Therapist Signature: Starla Yanez Date: 69/13/7356   Certification Period:            Re certification period:  Reporting Period: 8/17/21-11/15/21  10/12/21-1/10/22  8/17/21-9/14/21 Time: 1:05 PM   NOTE TO PHYSICIAN:  PLEASE COMPLETE THE ORDERS BELOW AND FAX TO   [facility]: [phone]  If you are unable to process this request in 24 hours please contact our office: [phone]    ___ I have read the above report and request that my patient continue as recommended.   ___ I have read the above report and request that my patient continue therapy with the following changes/special instructions: ________________________________________________   ___ I have read the above report and request that my patient be discharged from therapy.      Physician Signature:        Date:       Time:       Audra Bermudez MD

## 2021-10-19 ENCOUNTER — HOSPITAL ENCOUNTER (OUTPATIENT)
Dept: PHYSICAL THERAPY | Age: 66
Discharge: HOME | End: 2021-10-19
Payer: MEDICARE

## 2021-10-19 PROCEDURE — 97140 MANUAL THERAPY 1/> REGIONS: CPT

## 2021-10-19 PROCEDURE — 97535 SELF CARE MNGMENT TRAINING: CPT

## 2021-10-19 PROCEDURE — 97110 THERAPEUTIC EXERCISES: CPT

## 2021-10-19 PROCEDURE — 97530 THERAPEUTIC ACTIVITIES: CPT

## 2021-10-19 NOTE — PROGRESS NOTES
PT DAILY TREATMENT NOTE    Patient Name: Sangita Silverman  Date:10/19/2021  : 1955  [x]  Patient  Verified  Payor: Kirstin Solomon / Plan: [place] / Product Type: Managed Care Medicare /    In time:10:16a Out time:11:14a  Total Treatment Time (min): 58  Total Timed Codes (min): 58  1:1 Treatment Time ( only): 47  Visit #: 6 of 13    Treatment Area: Urinary frequency [R35.0]  Bladder pain [R39.89]  Nocturia [R35.1]    SUBJECTIVE  Pain Level (0-10 scale): 0/10  Any medication changes, allergies to medications, adverse drug reactions, diagnosis change, or new procedure performed?: [x] No    [] Yes (see summary sheet for update)  Subjective functional status/changes:   [] No changes reported  Patient reports she tried to drink water but the spout on her bottle got clogged with ice. She plans to go [facility] and look for different bottles. She hasn't been in pain for 2 days. She does not like the taste of water. She has heard from multiple sources ( MD, daughter, PT, and google) that inc water intake can help with constipation. OBJECTIVE    8 min Self Care: Educated patient in pain medication contributing to constipation cycle. I.e., dehydration causes constipation, constipation causes pain, pain leads to taking pain pills, pain pills also cause constipation. Pt educated that drinking sufficient water will aid in improving bowel movements, which should dec stomach pain, so she should not have to take medication. Rationale:  Increase awareness and understanding of current condition to improve patients ability to independently and effectively attain goals and progress towards long term management of current condition.      25 (21 1:1) min Therapeutic Exercise:  [] See flow sheet :   Rationale: increase ROM and increase strength to improve the patients ability to stand and perform ADLs    15 min Therapeutic Activity:  [x]  See flow sheet : FLAKITA cox, seated marching for improved bed mobility and transfers   Rationale: Decrease resting tone of the pelvic floor and Increase tissue extensibility of the pelvic floor muscles in order to Improve frequency and ease of bowel movements and Improve ability to engage in sexual intercourse. 10 min Manual Therapy:  SWATHI massage, pt also verbally instructed in SWATHI massage. Rationale: decrease pain and increase tissue extensibility in order to Improve frequency and ease of bowel movements and dec pelvic and abdominal pain for improved QOL. With   [] TE   [] TA   [] neuro   [] other: Patient Education: [x] Review HEP    [] Progressed/Changed HEP based on:   [] positioning   [] body mechanics   [] transfers   [] heat/ice application    [] other:      Other Objective/Functional Measures:    added stability ball circles and pelvic tilts, paloff press, abdominal isometrics   Pain Level (0-10 scale) post treatment: 0/10    ASSESSMENT/Changes in Function:   Pt program was expanded to include inc hip and core stability exercises today with intention of off loading pelvic floor, and also inc gastrointestinal motility. Pt was educated again regarding water intake, and also pain cycle which are both likely contributing to constipation. Pt verbalized understanding and intent to begin drinking more water. Pt was again instructed in SWATHI massage as she indicated she was not sure which direction to perform massage, pt was instructed to perform massage up the R side, across abdomen, and down the L side following path of ascending, transverse and descending colon.      Patient will continue to benefit from skilled PT services to modify and progress therapeutic interventions, address functional mobility deficits, address ROM deficits, address strength deficits, analyze and address soft tissue restrictions, analyze and cue movement patterns, analyze and modify body mechanics/ergonomics, assess and modify postural abnormalities, address imbalance/dizziness and instruct in home and community integration to attain remaining goals.      [x]  See Plan of Care  []  See progress note/recertification  []  See Discharge Summary         Progress towards goals / Updated goals:     1) Goal:  Patient intake of H20 at ~105 oz to address constipation  Status at last note/certification: 32 oz or less Pt continues to limit water intake 10/19/21  2) Goal: Patient will perform self tissue mobilization daily to address scar adhesions and tender mm tissue  Status at last note/certification: na, pt has not purchase pelvic wand  3) Goal: Patient independent in HEP  Status at last note/certification:Issued 8/13/21, updated 9/14/21, min compliance noted, reviewed today  4) Goal: Patient will report 50% improvement in overall function  Status at last note/certification: 20%  5) Goal: Patient will report worst pain at 4/10 for improved QOL  Status at last note/certification: 7/10  PLAN  []  Upgrade activities as tolerated     [x]  Continue plan of care  [x]  Update interventions per flow sheet       []  Discharge due to:_  []  Other:_      Lajoyce Peoples 10/19/2021  9:49 AM    Future Appointments   Date Time Provider Lalo Espino   10/19/2021 10:15 AM Ernestene Mountain [facility]   10/26/2021 11:00 AM Ernestene Mountain [facility]   11/2/2021 11:45 AM Ernestene Mountain [facility]   11/9/2021 11:45 AM Ernestene Mountain [facility]   11/16/2021 11:00 AM [facility] PT CHILLED POND 2 [facility]   11/19/2021  2:00 PM Antonia Richey, NP [address] [facility]   11/23/2021 11:45 AM Ernestene Mountain [facility]   11/30/2021 11:45 AM Ernestene Mountain [facility]

## 2021-10-26 ENCOUNTER — HOSPITAL ENCOUNTER (OUTPATIENT)
Dept: PHYSICAL THERAPY | Age: 66
Discharge: HOME | End: 2021-10-26
Payer: MEDICARE

## 2021-10-26 PROCEDURE — 97110 THERAPEUTIC EXERCISES: CPT

## 2021-10-26 PROCEDURE — 97530 THERAPEUTIC ACTIVITIES: CPT

## 2021-10-26 PROCEDURE — 97140 MANUAL THERAPY 1/> REGIONS: CPT

## 2021-10-26 NOTE — PROGRESS NOTES
PT DAILY TREATMENT NOTE    Patient Name: Abdoulaye Cheung  Date:10/26/2021  : 1955  [x]  Patient  Verified  Payor: Vane Fernando / Plan: [place] / Product Type: Managed Care Medicare /    In time:11:02a Out time:11:45a  Total Treatment Time (min): 43  Total Timed Codes (min): 43  1:1 Treatment Time ([facility] only): 43  Visit #: 7 of 13    Treatment Area: Urinary frequency [R35.0]  Bladder pain [R39.89]  Nocturia [R35.1]    SUBJECTIVE  Pain Level (0-10 scale): 3/10  Any medication changes, allergies to medications, adverse drug reactions, diagnosis change, or new procedure performed?: [x] No    [] Yes (see summary sheet for update)  Subjective functional status/changes:   [] No changes reported  Patient reports she has been having pain again for 3 days and had to get back on the pain pills. She also took a valium because she was having a spasm in her stomach that felt like an electric zap. She also took a stool softener, but thinks it was too harsh because its making her stomach cramp. She is also feeling bloated  She has not significantly increased her water intake. OBJECTIVE    0 min Self Care:    Rationale:  Increase awareness and understanding of current condition to improve patients ability to independently and effectively attain goals and progress towards long term management of current condition. 20 min Therapeutic Exercise:  [] See flow sheet :   Rationale: increase ROM and increase strength to improve the patients ability to stand and perform ADLs    15 min Therapeutic Activity:  [x]  See flow sheet : FLAKITA cox, for improved bed mobility and transfers   Rationale: Decrease resting tone of the pelvic floor and Increase tissue extensibility of the pelvic floor muscles in order to Improve frequency and ease of bowel movements and Improve ability to engage in sexual intercourse. 10 min Manual Therapy:  SWATHI massage, scar tissue mobilization of left lower quadrant. Rationale: decrease pain and increase tissue extensibility in order to Improve frequency and ease of bowel movements and dec pelvic and abdominal pain for improved QOL. With   [] TE   [] TA   [] neuro   [] other: Patient Education: [x] Review HEP    [] Progressed/Changed HEP based on:   [] positioning   [] body mechanics   [] transfers   [] heat/ice application    [] other:      Other Objective/Functional Measures:    added stability ball roll outs, total gym squats    Pain Level (0-10 scale) post treatment: 1/10    ASSESSMENT/Changes in Function:   Pt was able to tolerate total gym squats today in addition to other exercises without complaint. Stability ball roll outs were added with intention of improving trunk mobility for improved abdominal flexibility. Total gym squats were added with intention of improving hip mobility and strength for toilet transfers . Pt was also educated in seeking nutritional guidance, or doing her own research regarding probiotics as gut health can contribute to bloating and digestive issues. Pt continues to limit water intake. Pt was also educated in physical activity improving intestinal motility and digestion. Patient will continue to benefit from skilled PT services to modify and progress therapeutic interventions, address functional mobility deficits, address ROM deficits, address strength deficits, analyze and address soft tissue restrictions, analyze and cue movement patterns, analyze and modify body mechanics/ergonomics, assess and modify postural abnormalities, address imbalance/dizziness and instruct in home and community integration to attain remaining goals.      [x]  See Plan of Care  []  See progress note/recertification  []  See Discharge Summary         Progress towards goals / Updated goals:   1) Goal:  Patient intake of H20 at ~105 oz to address constipation  Status at last note/certification: 32 oz or less Pt continues to limit water intake 10/19/21  2) Goal: Patient will perform self tissue mobilization daily to address scar adhesions and tender mm tissue  Status at last note/certification: na, pt has not purchase pelvic wand  3) Goal: Patient independent in HEP  Status at last note/certification:Issued 8/13/21, updated 9/14/21, min compliance noted, reviewed today  4) Goal: Patient will report 50% improvement in overall function  Status at last note/certification: 20%  5) Goal: Patient will report worst pain at 4/10 for improved QOL  Status at last note/certification: 7/10  PLAN  []  Upgrade activities as tolerated     [x]  Continue plan of care  [x]  Update interventions per flow sheet       []  Discharge due to:_  []  Other:_      Maverick Junior 10/26/2021  9:49 AM    Future Appointments   Date Time Provider Lalo Espino   10/26/2021 11:00 AM Milus Eth [facility] [address]   11/2/2021 11:45 AM Milus Eth [facility] [address]   11/9/2021 11:45 AM Milus Eth [facility] [address]   11/16/2021 11:00 AM [address] PT CHILLED POND 2 [facility] [address]   11/19/2021  2:00 PM HERVE Hoover   11/23/2021 11:45 AM Milus Eth [facility] [address]   11/30/2021 11:45 AM Milus Eth [facility] [address]

## 2021-11-02 ENCOUNTER — APPOINTMENT (OUTPATIENT)
Dept: PHYSICAL THERAPY | Age: 66
End: 2021-11-02

## 2021-11-09 ENCOUNTER — APPOINTMENT (OUTPATIENT)
Dept: PHYSICAL THERAPY | Age: 66
End: 2021-11-09

## 2021-11-16 ENCOUNTER — APPOINTMENT (OUTPATIENT)
Dept: PHYSICAL THERAPY | Age: 66
End: 2021-11-16

## 2021-11-23 ENCOUNTER — APPOINTMENT (OUTPATIENT)
Dept: PHYSICAL THERAPY | Age: 66
End: 2021-11-23

## 2021-11-30 ENCOUNTER — APPOINTMENT (OUTPATIENT)
Dept: PHYSICAL THERAPY | Age: 66
End: 2021-11-30

## 2021-11-30 NOTE — PROGRESS NOTES
[address] [facility] PHYSICAL THERAPY  [address], [place], [address] - Phone: [phone]  Fax: [phone]  DISCHARGE SUMMARY FOR PHYSICAL THERAPY          Patient Name: Demond Garcia : 1955   Treatment/Medical Diagnosis: Urinary frequency [R35.0]  Bladder pain [R39.89]  Nocturia [R35.1]   Onset Date: ~    Referral Source: Heidi Bro MD [facility]): 2021   Prior Hospitalization: See Medical History Provider #: [id number]   Prior Level of Function: No hx of urine problem prior to    Comorbidities:  PMH relevant for kidney stone, tummy tuck, partial hysterectomy, hernia repair, stroke, JUSTO TKA, hx of fibroids   Medications: Verified on Patient Summary List   Visits from [facility]: 7 Missed Visits: 1     Goal/Measure of Progress Goal Met? 1.    Patient intake of H20 at ~105 oz to address constipation     Status at last Eval: 320z Current Status: 32 oz or less No, recent regression in H20 intake               Goal/Measure of Progress Goal Met? 1.  Patient will perform self tissue mobilization daily to address scar adhesions and tender mm tissue     Status at last Eval: Na- pt has not yet purchased pelvic wand Current Status: Na- pt has not yet purchased pelvic wand NA   2.  Patient independent in HEP     Status at last Eval: Issued 21, updated 21, min to mod compliance Current Status: Issued 21, updated 21, min compliance noted, reviewed today no   3.  Patient will report 50% improvement in urinary incontinence     Status at last Eval: 5% Current Status: 20% no               Goal/Measure of Progress Goal Met? 4.  Patient will report urinating 2x or less per night for sufficient rest     Status at last Eval: 4+ times per hour Current Status: 1-2x per night yes            Key Functional Changes/Progress: Pt was seen in clinic for a total of 7 visits from 21-10/26/21.  Pt was making limited progress likely due to dec adherence to behavioral recommendation changes suggested by therapist ( to inc water intake). Limitations in progress may also be attributed to dec attendance to clinic. Pt contacted clinic on 11/8/21 to request to be discharged from skilled care. Pt will be DC today per pt request. Most recent objective measure and goal progress from 10/12/21 were copied forward above and below.  A formal reassessment could not be completed due to unplanned discharge     Functional Gains: periods of decreasing pain( pt reports inc length of lowered pain periods), pt reports 3 days of no pain, taking less pain medication, improving stool consistency, improving nocturia,   Functional Deficits: continued pelvic and abdominal pain, constipation, feeling of incomplete bowel movements,  urinary leakage  % improvement: 20%  Pain   Average: 5/10                  Best: 0/10                Worst: 7 /10  Patient Goal: \"no pain\"      Objective:     Current urinary complaint  urinary frequency  urinary urgency  dysuria: pain during and after void  urge incontinence  Nocturia (improving)  dysfunctional voiding  constipation     Bladder complaint longevity:  1 - 3 years     Bladder symptom progression:                 Slowly improving     Frequency of UI: weekly to 1x per month depending on activity/laughter     Pad use:  none     Daytime urinary frequency:1-2x per day     Nocturia:  1-2x per night (improving in comparison 4+ times per hour per night)     Pain complaint:  Location:bladder pain  suprapubic pain/discomfort  lower abdomen  LLQ     Pain scale:  Verbal Analog scale: average daily pain 5, best day 0, worst pain 7     Pain description:  daily: constant- pt reports pain duration bouts are decreasing ( inc lowered pain times)  worsens with: laying on L side, night time  improves with: pain patches, medication, heat     Pelvic floor manual exam: Not tested today 10/12/21     Pelvic floor MMT  3 - good contraction, 3-5 seconds  PERF (Performance/Endurance/Repetitions//Flicks): 3/9/3/89 KKW pt report     Pelvic floor manual exam: not tested today 10/12/21     FOTO= 66/100 (-6 points since [facility])     Global Core Strength/stability  Hip ROM: ext L=10 deg, R=5 deg,   Hip strength: flexion L=3/5( mild pain), R=3/5, ext JUSTO 3/5, abd  L=3+/5 p!, R=4-/5  Transverse abdominus activation: fair+ with dec endurance. Abdominal bulging noted with head lift  Functional squat: NT today  SLS: NT today  TTP: bladder, abdominals at midline near hernia repair, LLQ, L hip flexor, L psoas, L TFL        Assessments/Recommendations: Other: Discharge per pt request.    If you have any questions/comments please contact us directly at [phone]. Thank you for allowing us to assist in the care of your patient.     Therapist Signature: Devorah Ocampo Date: 11/30/21   Reporting Period: 10/12/21-11/30/21 Time: 4:33 PM

## 2022-03-08 ENCOUNTER — NEW PATIENT (OUTPATIENT)
Dept: URBAN - METROPOLITAN AREA CLINIC 1 | Facility: CLINIC | Age: 67
End: 2022-03-08

## 2022-03-08 DIAGNOSIS — H25.813: ICD-10-CM

## 2022-03-08 PROCEDURE — 92004 COMPRE OPH EXAM NEW PT 1/>: CPT

## 2022-03-08 PROCEDURE — 92015 DETERMINE REFRACTIVE STATE: CPT

## 2022-03-08 ASSESSMENT — TONOMETRY
OD_IOP_MMHG: 14
OS_IOP_MMHG: 14

## 2022-03-08 ASSESSMENT — VISUAL ACUITY
OS_CC: J2
OD_BAT: 20/100
OD_CC: 20/40-2
OS_BAT: 20/60
OS_CC: 20/25-2
OD_CC: J3

## 2022-03-18 PROBLEM — D50.9 IRON (FE) DEFICIENCY ANEMIA: Status: ACTIVE | Noted: 2020-01-13

## 2022-04-08 ENCOUNTER — PRE-OP/H&P (OUTPATIENT)
Dept: URBAN - METROPOLITAN AREA CLINIC 1 | Facility: CLINIC | Age: 67
End: 2022-04-08

## 2022-04-08 VITALS
WEIGHT: 215 LBS | BODY MASS INDEX: 33.74 KG/M2 | HEIGHT: 67 IN | DIASTOLIC BLOOD PRESSURE: 64 MMHG | SYSTOLIC BLOOD PRESSURE: 134 MMHG | HEART RATE: 89 BPM

## 2022-04-08 DIAGNOSIS — H25.813: ICD-10-CM

## 2022-04-08 PROCEDURE — 92136 OPHTHALMIC BIOMETRY: CPT

## 2022-04-08 PROCEDURE — 92012 INTRM OPH EXAM EST PATIENT: CPT

## 2022-04-08 ASSESSMENT — VISUAL ACUITY
OD_CC: 20/30
OS_CC: 20/25

## 2022-04-08 NOTE — PATIENT DISCUSSION
Visually Significant secondary to glare, discussed the risks, benefits, alternatives, and limitations of cataract surgery. The patient stated a full understanding and a desire to proceed with the procedure. Discussed with patient if post-op drops are more than $120 for all three combined when filling at their Pharmacy, please call our office to request generic substitutions. Patient came to pre-op appointment alone and lifestyle questioner completed. Patient understands she will need specs post-op to achieve best corrected vision.

## 2022-04-20 ENCOUNTER — SURGERY/PROCEDURE (OUTPATIENT)
Dept: URBAN - METROPOLITAN AREA SURGERY 2 | Facility: SURGERY | Age: 67
End: 2022-04-20

## 2022-04-20 DIAGNOSIS — H25.813: ICD-10-CM

## 2022-04-20 PROBLEM — Z96.1: Noted: 2022-04-20

## 2022-04-20 PROBLEM — H25.812 COMBINED FORM OF SENILE CATARACT OF LEFT EYE: Status: ACTIVE | Noted: 2022-04-20

## 2022-04-20 PROCEDURE — 66984 XCAPSL CTRC RMVL W/O ECP: CPT

## 2022-04-21 ENCOUNTER — POST-OP (OUTPATIENT)
Dept: URBAN - METROPOLITAN AREA CLINIC 1 | Facility: CLINIC | Age: 67
End: 2022-04-21

## 2022-04-21 DIAGNOSIS — Z96.1: ICD-10-CM

## 2022-04-21 PROCEDURE — 99024 POSTOP FOLLOW-UP VISIT: CPT

## 2022-04-21 ASSESSMENT — TONOMETRY: OS_IOP_MMHG: 16

## 2022-04-21 ASSESSMENT — VISUAL ACUITY: OS_SC: 20/50

## 2022-04-21 NOTE — PATIENT DISCUSSION
Use Pred BID OS, Ocuflox TID OS, and Prolensa QD OS: Use all three gtts through completion of PO gtt chart regimen/ Per our instructions given to patient.

## 2022-04-21 NOTE — PATIENT DISCUSSION
Visually Significant (secondary to glare), discussed the risks, benefits, alternatives, and limitations of cataract surgery. The patient stated a full understanding and a desire to proceed with the procedure. The patient will need to return for pre-op appointment with cataract measurements and to have any additional questions answered, and start pre-operative eye drops as directed.

## 2022-05-09 ENCOUNTER — POST OP/EVAL OF SECOND EYE (OUTPATIENT)
Dept: URBAN - METROPOLITAN AREA CLINIC 1 | Facility: CLINIC | Age: 67
End: 2022-05-09

## 2022-05-09 DIAGNOSIS — H25.811: ICD-10-CM

## 2022-05-09 PROCEDURE — 99024 POSTOP FOLLOW-UP VISIT: CPT

## 2022-05-09 PROCEDURE — 92136 OPHTHALMIC BIOMETRY: CPT

## 2022-05-09 ASSESSMENT — VISUAL ACUITY: OS_SC: 20/50

## 2022-05-09 ASSESSMENT — TONOMETRY: OS_IOP_MMHG: 10

## 2022-05-09 NOTE — PATIENT DISCUSSION
Cataract OD -- Visually Significant (Secondary to glare), discussed the risks, benefits, alternatives, and limitations of cataract surgery. The patient stated a full understanding and a desire to proceed with the procedure. Discussed with patient if post-op drops are more than $120 for all three combined when filling at their Pharmacy, please call our office to request generic substitutions. [employer]aco PCL.  Myopic Goal.

## 2022-05-18 PROBLEM — H25.811 COMBINED FORM OF SENILE CATARACT OF RIGHT EYE: Status: ACTIVE | Noted: 2022-05-18

## 2022-05-19 ENCOUNTER — POST-OP (OUTPATIENT)
Dept: URBAN - METROPOLITAN AREA CLINIC 1 | Facility: CLINIC | Age: 67
End: 2022-05-19

## 2022-05-19 DIAGNOSIS — Z96.1: ICD-10-CM

## 2022-05-19 PROCEDURE — 99024 POSTOP FOLLOW-UP VISIT: CPT

## 2022-05-19 ASSESSMENT — TONOMETRY
OD_IOP_MMHG: 18
OS_IOP_MMHG: 12

## 2022-05-19 ASSESSMENT — VISUAL ACUITY
OS_SC: 20/30-1
OS_PH: 20/25-1
OD_SC: 20/40
OD_PH: 20/25-2

## 2022-05-19 NOTE — PATIENT DISCUSSION
Post-Op findings as expected. Continue drop regimen as directed. Light tobacco smoker Current some day smoker

## 2022-06-03 ENCOUNTER — EMERGENCY VISIT (OUTPATIENT)
Dept: URBAN - METROPOLITAN AREA CLINIC 1 | Facility: CLINIC | Age: 67
End: 2022-06-03

## 2022-06-03 DIAGNOSIS — Z96.1: ICD-10-CM

## 2022-06-03 DIAGNOSIS — H43.812: ICD-10-CM

## 2022-06-03 DIAGNOSIS — H10.45: ICD-10-CM

## 2022-06-03 DIAGNOSIS — H16.142: ICD-10-CM

## 2022-06-03 DIAGNOSIS — H04.123: ICD-10-CM

## 2022-06-03 PROCEDURE — 92012 INTRM OPH EXAM EST PATIENT: CPT

## 2022-06-03 ASSESSMENT — VISUAL ACUITY
OS_SC: 20/30-2
OD_SC: J2
OD_SC: 20/40
OS_SC: J2

## 2022-06-03 ASSESSMENT — TONOMETRY
OD_IOP_MMHG: 16
OS_IOP_MMHG: 15

## 2022-06-21 ENCOUNTER — POST-OP (OUTPATIENT)
Dept: URBAN - METROPOLITAN AREA CLINIC 1 | Facility: CLINIC | Age: 67
End: 2022-06-21

## 2022-06-21 DIAGNOSIS — Z96.1: ICD-10-CM

## 2022-06-21 PROCEDURE — 99024 POSTOP FOLLOW-UP VISIT: CPT

## 2022-06-21 ASSESSMENT — TONOMETRY
OD_IOP_MMHG: 14
OS_IOP_MMHG: 14

## 2022-06-21 ASSESSMENT — VISUAL ACUITY
OD_SC: 20/40
OS_SC: 20/40

## 2022-08-03 NOTE — PATIENT DISCUSSION
Educated patient on findings and condition. Prescribe Maxitrol asia up to TID OD x 7 days. Continue warm compresses (5-10 minutes) BID-TID. Patient already taking oral antibiotic for UTI. Advised to call/RTC if si/sx persist or worsen. Monitor.

## 2022-08-05 ENCOUNTER — OFFICE VISIT (OUTPATIENT)
Dept: CARDIOLOGY CLINIC | Age: 67
End: 2022-08-05

## 2022-08-05 ENCOUNTER — OFFICE VISIT (OUTPATIENT)
Dept: CARDIOLOGY CLINIC | Age: 67
End: 2022-08-05
Payer: MEDICARE

## 2022-08-05 VITALS
TEMPERATURE: 97.8 F | BODY MASS INDEX: 35.83 KG/M2 | SYSTOLIC BLOOD PRESSURE: 124 MMHG | WEIGHT: 222 LBS | DIASTOLIC BLOOD PRESSURE: 88 MMHG | HEART RATE: 75 BPM | OXYGEN SATURATION: 98 %

## 2022-08-05 DIAGNOSIS — I10 HYPERTENSION, UNSPECIFIED TYPE: Primary | ICD-10-CM

## 2022-08-05 PROCEDURE — 93000 ELECTROCARDIOGRAM COMPLETE: CPT | Performed by: INTERNAL MEDICINE

## 2022-08-05 PROCEDURE — 99214 OFFICE O/P EST MOD 30 MIN: CPT | Performed by: INTERNAL MEDICINE

## 2022-08-05 PROCEDURE — G8400 PT W/DXA NO RESULTS DOC: HCPCS | Performed by: INTERNAL MEDICINE

## 2022-08-05 PROCEDURE — G8510 SCR DEP NEG, NO PLAN REQD: HCPCS | Performed by: INTERNAL MEDICINE

## 2022-08-05 PROCEDURE — 1090F PRES/ABSN URINE INCON ASSESS: CPT | Performed by: INTERNAL MEDICINE

## 2022-08-05 PROCEDURE — G8428 CUR MEDS NOT DOCUMENT: HCPCS | Performed by: INTERNAL MEDICINE

## 2022-08-05 PROCEDURE — G8536 NO DOC ELDER MAL SCRN: HCPCS | Performed by: INTERNAL MEDICINE

## 2022-08-05 PROCEDURE — 3017F COLORECTAL CA SCREEN DOC REV: CPT | Performed by: INTERNAL MEDICINE

## 2022-08-05 PROCEDURE — 1101F PT FALLS ASSESS-DOCD LE1/YR: CPT | Performed by: INTERNAL MEDICINE

## 2022-08-05 PROCEDURE — 1123F ACP DISCUSS/DSCN MKR DOCD: CPT | Performed by: INTERNAL MEDICINE

## 2022-08-05 PROCEDURE — G8417 CALC BMI ABV UP PARAM F/U: HCPCS | Performed by: INTERNAL MEDICINE

## 2022-08-05 RX ORDER — LISINOPRIL 5 MG/1
5 TABLET ORAL DAILY
COMMUNITY
Start: 2021-08-26 | End: 2022-08-05 | Stop reason: DRUGHIGH

## 2022-08-05 RX ORDER — LISINOPRIL 10 MG/1
10 TABLET ORAL DAILY
Qty: 90 TABLET | Refills: 3 | Status: SHIPPED | OUTPATIENT
Start: 2022-08-05 | End: 2022-08-23

## 2022-08-05 RX ORDER — TIOTROPIUM BROMIDE INHALATION SPRAY 1.56 UG/1
2 SPRAY, METERED RESPIRATORY (INHALATION) DAILY
COMMUNITY
Start: 2022-05-31

## 2022-08-05 RX ORDER — ATORVASTATIN CALCIUM 40 MG/1
1 TABLET, FILM COATED ORAL
COMMUNITY
Start: 2022-08-02

## 2022-08-05 NOTE — PROGRESS NOTES
[facility]    Ms. Rebeca Berrios is a 58-year-old female with a history of DJD, asthma, abdominal hernia, hypertension, CVA    Patient is here today for second opinion. She denies prior history of MI or CHF. Patient is here today for second opinion regarding her dizziness. She has been seen by PCP and also neurology team as well as primary cardiologist for ongoing dizziness. She never had a syncopal episode. This episodes are random. No chest pain or chest tightness. Denies any significant palpitation. She had extensive cardiac and neuro work-up. Patient recently was seen by ENT and during one of the testing in their office, patient experienced similar episode while they were trying to do some testing on the right ear. She is still awaiting the results. No orthopnea. Her blood pressure medication has been reduced from 10 mg to 5 mg lisinopril because of dizziness. On and off ankle edema  Denies any nausea, vomiting, abdominal pain, fever, chills, sputum production. No hematuria or other bleeding complaints    Past Medical History:   Diagnosis Date    Adverse effect of anesthesia     pt states very resistant to sedation and numbing     Arthritis     Asthma 04/14/2022    NEW ONSET    Hernia, abdominal     Hypertension     Stroke [facility]) 5/2012       Review of Systems:  Cardiac symptoms as noted above in HPI. All others negative. Denies fatigue, malaise, skin rash, joint pain, blurring vision, photophobia, neck pain, hemoptysis, chronic cough, nausea, vomiting, hematuria, burning micturition, BRBPR, chronic headaches. Current Outpatient Medications   Medication Sig    atorvastatin (LIPITOR) 40 mg tablet Take 1 Tablet by mouth nightly. lisinopriL (PRINIVIL, ZESTRIL) 5 mg tablet Take 5 mg by mouth in the morning. NIFEdipine ER (ADALAT CC) 30 mg ER tablet Take  by mouth daily. aspirin 81 mg chewable tablet Take 81 mg by mouth daily. Spiriva Respimat 1.25 mcg/actuation inhaler Take 2 Puffs by inhalation daily. OTHER PIZER COVID 19 VAC X3    HYDROcodone-acetaminophen (NORCO) 5-325 mg per tablet ONE TABLET TWICE A DAY AS NEEDED FOR PAIN    propranoloL (INDERAL) 10 mg tablet     diazepam (VALIUM) 5 mg tablet Take 5 mg by mouth every six (6) hours as needed for Anxiety. No current facility-administered medications for this visit. Past Surgical History:   Procedure Laterality Date    COLONOSCOPY N/A 1/13/2020    DIAGNOSTIC COLONOSCOPY /c Bx Polypectomy performed by Gila Muñoz MD at [facility] ENDOSCOPY    HX GYN  2004    hysterectomy    HX HERNIA REPAIR      HX ORTHOPAEDIC  2011,2013    Both knees replaced       Allergies and Sensitivities:  Allergies   Allergen Reactions    Amlodipine Other (comments)     Studdering and can't speak    Neuromuscular Blockers, Steroidal Other (comments)     Steroids make pt's blood pressure go up. Nsaids (Non-Steroidal Anti-Inflammatory Drug) Other (comments)     Pt states that it raises her bp    Prednisone Other (comments)     Increases BP       Family History:  No family history on file. Social History:  Social History     Tobacco Use    Smoking status: Never    Smokeless tobacco: Never   Substance Use Topics    Alcohol use: No    Drug use: No     She  reports that she has never smoked. She has never used smokeless tobacco.  She  reports no history of alcohol use. Physical Exam:  BP Readings from Last 3 Encounters:   08/05/22 124/88   05/18/22 122/87   04/20/22 111/77         Pulse Readings from Last 3 Encounters:   08/05/22 75   05/18/22 69   04/20/22 71          Wt Readings from Last 3 Encounters:   08/05/22 100.7 kg (222 lb)   05/11/22 97.5 kg (215 lb)   04/20/22 97.5 kg (215 lb)       Constitutional: Oriented to person, place, and time. HENT: Head: Normocephalic and atraumatic. Eyes: Conjunctivae and extraocular motions are normal.   Neck: No JVD present.  Carotid bruit is not appreciated. Cardiovascular: Regular rhythm. No murmur, gallop or rubs appreciated  Lung: Breath sounds normal. No respiratory distress. No ronchi or rales appreciated  Abdominal: No tenderness. No rebound and no guarding. Musculoskeletal: There is/1+ ankle and foot edema. No cynosis  Lymphadenopathy:  No cervical or supraclavicular adenopathy appriciated. Neurological: No gross motor deficit noted. Skin: No visible skin rash noted. No Ear discharge noted  Psychiatric: Normal mood and affect. LABS:   @  Lab Results   Component Value Date/Time    WBC 5.3 10/24/2017 12:11 PM    HGB 12.1 10/24/2017 12:11 PM    HCT 37.5 11/11/2019 11:55 AM    PLATELET 336 34/67/6441 12:11 PM    MCV 79.7 10/24/2017 12:11 PM     Lab Results   Component Value Date/Time    Sodium 139 10/24/2017 12:11 PM    Potassium 4.0 10/24/2017 12:11 PM    Chloride 103 10/24/2017 12:11 PM    CO2 28 10/24/2017 12:11 PM    Glucose 88 10/24/2017 12:11 PM    BUN 13 10/24/2017 12:11 PM    Creatinine 0.69 10/24/2017 12:11 PM     No flowsheet data found. Lab Results   Component Value Date/Time    ALT (SGPT) 20 10/24/2017 12:11 PM     Lab Results   Component Value Date/Time    Hemoglobin A1c 6.1 (H) 10/24/2017 12:11 PM     Lab Results   Component Value Date/Time    TSH 1.07 10/24/2017 12:11 PM     STRESS TEST (02/2021)  CONCLUSIONS     * No diagnostic perfusion abnormality. Mild soft tissue attenuation without reversible or persistent perfusion defect indicative of inducible ischemia or scar. * Normal left ventricular volume and function without regional wall motion abnormalities seen, EF 69%. ECHO (08/2021)  CONCLUSIONS     * Left ventricular systolic function is normal with an ejection fraction of 61 % by Ramos's biplane. * Left ventricular chamber volume is normal.     * Left ventricular diastolic function: normal for age. * Right ventricular systolic function is normal with TAPSE measuring 1.80 cm.      * Right ventricular chamber dimension is normal.     * No hemodynamically significant valvular disease. * No pulmonary hypertension, estimated pulmonary arterial systolic pressure is 19 mmHg. IMPRESSION & PLAN:  Collette Haddock is 71-year-old female    Dizziness:  Patient being seen by primary cardiologist, neurology and primary team as well as ENT  This could be multifactorial however dizziness because of hypertensive medication cannot be completely excluded. Will discontinue nifedipine with ankle edema. Will increase lisinopril to 10 mg daily. She will keep checking her blood pressure on a daily basis over next couple weeks and we will have her come back in the office. She is also expecting a result of ENT test which was done in office there during which episode of dizziness was reproduced during testing. I will defer that to ENT team  She is also following with neurology team Dr. Collins Perez    Her dyspnea has been extensively worked up by primary cardiologist.  NST in 02/2022, low risk, no evidence of ischemia or scar. Normal EF  NST  6/2/14: \"No diagnostic abnormality\". NST 10/17/16 -- Negative for scar or ischemia. Hypertension. As mentioned above, discontinuing nifedipine. Will increase lisinopril to 10 mg daily. She will keep checking blood pressure at home. May be contributing to dizziness. TIA. MRA Brain 7/31/16: Occluded intradural R vertebral artery. Thank you for allowing me to participate in patient care. Please feel free to call me if you have any question or concern. Meg Malik MD  Please note: This document has been produced using voice recognition software. Unrecognized errors in transcription may be present.

## 2022-08-05 NOTE — PATIENT INSTRUCTIONS
Please return for a 3 week BP check    New Medication/Medication Changes    Stop nifedipine    Increase lisinopril 10 mg daily    **please allow 24-48 hrs for medication to be escribed to pharmacy** If you need any refills on medications please contact your pharmacy so that the request can be escribed to the provider for review.

## 2022-08-10 ENCOUNTER — FOLLOW UP (OUTPATIENT)
Dept: URBAN - METROPOLITAN AREA CLINIC 1 | Facility: CLINIC | Age: 67
End: 2022-08-10

## 2022-08-10 DIAGNOSIS — Z96.1: ICD-10-CM

## 2022-08-10 DIAGNOSIS — H16.142: ICD-10-CM

## 2022-08-10 DIAGNOSIS — H04.123: ICD-10-CM

## 2022-08-10 PROCEDURE — 92012 INTRM OPH EXAM EST PATIENT: CPT

## 2022-08-10 ASSESSMENT — VISUAL ACUITY
OD_CC: 20/40-2
OS_CC: 20/40

## 2022-08-10 ASSESSMENT — KERATOMETRY
OD_AXISANGLE2_DEGREES: 6
OS_AXISANGLE2_DEGREES: 176
OS_K2POWER_DIOPTERS: 40.25
OD_K1POWER_DIOPTERS: 40.75
OS_K1POWER_DIOPTERS: 37.00
OS_AXISANGLE_DEGREES: 086
OD_AXISANGLE_DEGREES: 096
OD_K2POWER_DIOPTERS: 42.00

## 2022-08-10 ASSESSMENT — TONOMETRY
OS_IOP_MMHG: 16
OD_IOP_MMHG: 16

## 2022-08-10 NOTE — PATIENT DISCUSSION
Diagnosis discussed with the patient today. Advised the patient to continue the use of AT's 5 times a day OU.

## 2022-08-15 ENCOUNTER — TELEPHONE (OUTPATIENT)
Dept: CARDIOLOGY CLINIC | Age: 67
End: 2022-08-15

## 2022-08-15 DIAGNOSIS — I10 HYPERTENSION, UNSPECIFIED TYPE: Primary | ICD-10-CM

## 2022-08-15 NOTE — TELEPHONE ENCOUNTER
Patient states that ever since she saw Noel Campos on 08/05 and he changed her meds her BP has been out of control. Patient states that she woke up to a BP of 168/104 this morning. Patient ios concerned if it keep climbing. When she went to bed it was 160. Patient wants to know if she should go back to her original medication.

## 2022-08-16 NOTE — TELEPHONE ENCOUNTER
Contacted pt at home number. Two patient Identifiers confirmed. Advised pt per Dr. Derrick Garvin advise PT to take lisinopril 10 mg BID and Inderal 20 mg BID. PT to return in 3 weeks for BP check and also get a BMP done at the lab. Pt verbalized understanding.

## 2022-08-22 NOTE — TELEPHONE ENCOUNTER
Incoming from pt. Two patient Identifiers confirmed. Advised she had been taking medication as rx by provider and BP and   175/98 HR 72. Pt stated the new dosage \" is not working; the muscle spasm and swimming has gone away. I am scared to death to have another stroke. I don't want to go to ER because all they are going to do is give me IV fluids and tell me to come see yall, so im not going. Since I willem off the nifedipine my BP has been through the roof. I already took the propanolol and lisinopril 20 mg and have been taking vinegar. Tonight I'm going back on the nifedipine because the BP will not go down. It's high all day long\" Advised I would forward message to Dr Erwin Bey for review and appt moved up. Pt verbalized understanding.

## 2022-08-23 RX ORDER — NIFEDIPINE 30 MG/1
TABLET, FILM COATED, EXTENDED RELEASE ORAL DAILY
COMMUNITY
End: 2022-09-01

## 2022-08-23 NOTE — TELEPHONE ENCOUNTER
Verbal order and read back per Lizzie Menendez MD  Can d/c lisinopril and restart nifedipine 30 mg ER

## 2022-08-23 NOTE — TELEPHONE ENCOUNTER
Contacted pt at [facility] beds at the [facility] number. Two patient Identifiers confirmed. Advised pt per Dr Sherif Barbosa. Pt stated her BP this am was  117/80 after taking nifedipine 30 mg ER. Pt verbalized understanding and stated she still wants to review alternate  medications due to her s/s with nifedipine at 9/1/2022. Pt stated she took her medication at night. No other issues noted.

## 2022-08-29 ENCOUNTER — TELEPHONE (OUTPATIENT)
Dept: CARDIOLOGY CLINIC | Age: 67
End: 2022-08-29

## 2022-08-29 NOTE — TELEPHONE ENCOUNTER
Patient called in regards to her meds that Maeve Cordon put her on.  She is slightly confused about her meds and she believes she is not taking them how she should be

## 2022-08-29 NOTE — TELEPHONE ENCOUNTER
Contacted pt at home number. Two patient Identifiers confirmed. PT stated she was confused on her instructions on how to take lisinopril and Inderal. Advsied PT pm med dosing . PT has an appt 9/1/22 with Dr. Rosa Flores and will discuss medications again with him.

## 2022-09-01 ENCOUNTER — OFFICE VISIT (OUTPATIENT)
Dept: CARDIOLOGY CLINIC | Age: 67
End: 2022-09-01
Payer: MEDICARE

## 2022-09-01 VITALS
BODY MASS INDEX: 35.83 KG/M2 | WEIGHT: 222 LBS | TEMPERATURE: 97.6 F | SYSTOLIC BLOOD PRESSURE: 117 MMHG | DIASTOLIC BLOOD PRESSURE: 70 MMHG | RESPIRATION RATE: 16 BRPM | HEART RATE: 64 BPM | OXYGEN SATURATION: 97 %

## 2022-09-01 DIAGNOSIS — I10 ESSENTIAL HYPERTENSION WITH GOAL BLOOD PRESSURE LESS THAN 140/90: Primary | ICD-10-CM

## 2022-09-01 PROCEDURE — 99214 OFFICE O/P EST MOD 30 MIN: CPT | Performed by: INTERNAL MEDICINE

## 2022-09-01 PROCEDURE — G8417 CALC BMI ABV UP PARAM F/U: HCPCS | Performed by: INTERNAL MEDICINE

## 2022-09-01 PROCEDURE — 3017F COLORECTAL CA SCREEN DOC REV: CPT | Performed by: INTERNAL MEDICINE

## 2022-09-01 PROCEDURE — G8536 NO DOC ELDER MAL SCRN: HCPCS | Performed by: INTERNAL MEDICINE

## 2022-09-01 PROCEDURE — 1101F PT FALLS ASSESS-DOCD LE1/YR: CPT | Performed by: INTERNAL MEDICINE

## 2022-09-01 PROCEDURE — 1123F ACP DISCUSS/DSCN MKR DOCD: CPT | Performed by: INTERNAL MEDICINE

## 2022-09-01 PROCEDURE — 1090F PRES/ABSN URINE INCON ASSESS: CPT | Performed by: INTERNAL MEDICINE

## 2022-09-01 PROCEDURE — G8400 PT W/DXA NO RESULTS DOC: HCPCS | Performed by: INTERNAL MEDICINE

## 2022-09-01 PROCEDURE — G8428 CUR MEDS NOT DOCUMENT: HCPCS | Performed by: INTERNAL MEDICINE

## 2022-09-01 PROCEDURE — G8510 SCR DEP NEG, NO PLAN REQD: HCPCS | Performed by: INTERNAL MEDICINE

## 2022-09-01 RX ORDER — LISINOPRIL 20 MG/1
TABLET ORAL 2 TIMES DAILY
COMMUNITY

## 2022-09-01 NOTE — PATIENT INSTRUCTIONS
Contact office to review meds:  **Have patient call office with meds; I would like to to take propanolol 20 mg twice a day and if she is taking lisinopril 10 mg twice a day I would like her to increase and take lisinopril 15 mg twice a day**

## 2022-09-01 NOTE — PROGRESS NOTES
[facility]    Ms. Isak Cordero is a 78 y. o.female with a history of DJD, asthma, abdominal hernia, hypertension, CVA    Patient is here today for follow-up appointment for her hypertension. She denies prior history of MI or CHF. Patient continues to have systolic blood pressure up to 150 mmHg. Patient admits that she does not perform regular exercise. She is staying all day in the home reading her books. She admits that she is very afraid of going out because of COVID. She has been vaccinated. She denies any angina or heart failure. She claims that she is taking propranolol 20 mg twice daily and she is taking lisinopril only 10 mg twice daily. Somewhat different than what however medical records indicate. Denies any nausea, vomiting, abdominal pain, fever, chills, sputum production. No hematuria or other bleeding complaints    Past Medical History:   Diagnosis Date    Adverse effect of anesthesia     pt states very resistant to sedation and numbing     Arthritis     Asthma 04/14/2022    NEW ONSET    Hernia, abdominal     Hypertension     Stroke [facility]) 5/2012       Review of Systems:  Cardiac symptoms as noted above in HPI. All others negative. Denies fatigue, malaise, skin rash, joint pain, blurring vision, photophobia, neck pain, hemoptysis, chronic cough, nausea, vomiting, hematuria, burning micturition, BRBPR, chronic headaches. Current Outpatient Medications   Medication Sig    lisinopriL (PRINIVIL, ZESTRIL) 20 mg tablet Take  by mouth two (2) times a day. atorvastatin (LIPITOR) 40 mg tablet Take 1 Tablet by mouth nightly. propranoloL (INDERAL) 10 mg tablet     aspirin 81 mg chewable tablet Take 81 mg by mouth daily. Spiriva Respimat 1.25 mcg/actuation inhaler Take 2 Puffs by inhalation daily.     OTHER PIZER COVID 19 VAC X3    HYDROcodone-acetaminophen (NORCO) 5-325 mg per tablet ONE TABLET TWICE A DAY AS NEEDED FOR PAIN diazepam (VALIUM) 5 mg tablet Take 5 mg by mouth every six (6) hours as needed for Anxiety. No current facility-administered medications for this visit. Past Surgical History:   Procedure Laterality Date    COLONOSCOPY N/A 1/13/2020    DIAGNOSTIC COLONOSCOPY /c Bx Polypectomy performed by Albert Talbot MD at [facility] ENDOSCOPY    HX GYN  2004    hysterectomy    HX HERNIA REPAIR      HX ORTHOPAEDIC  2011,2013    Both knees replaced       Allergies and Sensitivities:  Allergies   Allergen Reactions    Amlodipine Other (comments)     Studdering and can't speak    Neuromuscular Blockers, Steroidal Other (comments)     Steroids make pt's blood pressure go up. Nsaids (Non-Steroidal Anti-Inflammatory Drug) Other (comments)     Pt states that it raises her bp    Prednisone Other (comments)     Increases BP       Family History:  No family history on file. Social History:  Social History     Tobacco Use    Smoking status: Never    Smokeless tobacco: Never   Substance Use Topics    Alcohol use: No    Drug use: No     She  reports that she has never smoked. She has never used smokeless tobacco.  She  reports no history of alcohol use. Physical Exam:  BP Readings from Last 3 Encounters:   09/01/22 (!) 142/82   08/05/22 124/88   05/18/22 122/87         Pulse Readings from Last 3 Encounters:   09/01/22 68   08/05/22 75   05/18/22 69          Wt Readings from Last 3 Encounters:   09/01/22 100.7 kg (222 lb)   08/05/22 100.7 kg (222 lb)   05/11/22 97.5 kg (215 lb)       Constitutional: Oriented to person, place, and time. HENT: Head: Normocephalic and atraumatic. Eyes:   Neck: No JVD present. Cardiovascular: Regular rhythm. No murmur, gallop or rubs appreciated  Lung: Breath sounds normal. No respiratory distress. No ronchi or rales appreciated  Abdominal: No tenderness. No rebound and no guarding. Musculoskeletal: There is/trace ankle and foot edema.  No cynosis      LABS:   @  Lab Results Component Value Date/Time    WBC 5.3 10/24/2017 12:11 PM    HGB 12.1 10/24/2017 12:11 PM    HCT 37.5 11/11/2019 11:55 AM    PLATELET 957 23/31/6233 12:11 PM    MCV 79.7 10/24/2017 12:11 PM     Lab Results   Component Value Date/Time    Sodium 139 10/24/2017 12:11 PM    Potassium 4.0 10/24/2017 12:11 PM    Chloride 103 10/24/2017 12:11 PM    CO2 28 10/24/2017 12:11 PM    Glucose 88 10/24/2017 12:11 PM    BUN 13 10/24/2017 12:11 PM    Creatinine 0.69 10/24/2017 12:11 PM     No flowsheet data found. Lab Results   Component Value Date/Time    ALT (SGPT) 20 10/24/2017 12:11 PM     Lab Results   Component Value Date/Time    Hemoglobin A1c 6.1 (H) 10/24/2017 12:11 PM     Lab Results   Component Value Date/Time    TSH 1.07 10/24/2017 12:11 PM     STRESS TEST (02/2021)  CONCLUSIONS     * No diagnostic perfusion abnormality. Mild soft tissue attenuation without reversible or persistent perfusion defect indicative of inducible ischemia or scar. * Normal left ventricular volume and function without regional wall motion abnormalities seen, EF 69%. ECHO (08/2021)  CONCLUSIONS     * Left ventricular systolic function is normal with an ejection fraction of 61 % by Ramos's biplane. * Left ventricular chamber volume is normal.     * Left ventricular diastolic function: normal for age. * Right ventricular systolic function is normal with TAPSE measuring 1.80 cm. * Right ventricular chamber dimension is normal.     * No hemodynamically significant valvular disease. * No pulmonary hypertension, estimated pulmonary arterial systolic pressure is 19 mmHg. IMPRESSION & PLAN:  Breana Briones is 48-year-old female    Dizziness:  Patient being seen by primary cardiologist, neurology and primary team as well as ENT  This could be multifactorial however dizziness because of hypertensive medication cannot be completely excluded.   Patient recently had MRI and other detailed ENT work-up and she was told that everything was looking okay. Her dizziness is better after discontinuing nifedipine however not completely resolved. NST in 02/2022, low risk, no evidence of ischemia or scar. Normal EF  NST  6/2/14: \"No diagnostic abnormality\". NST 10/17/16 -- Negative for scar or ischemia. Hypertension. /82. Patient's states that she is taking propranolol 20 mg twice daily and lisinopril 10 mg twice daily. This is different than our medical records indicate. I have asked her to go home and call me exactly with the medication she is taking. If she is taking the dose she is mentioning, I am going to ask her to increase lisinopril to 50 mg twice daily. She will keep checking blood pressure at home. Strongly encouraged patient to start doing activity and workout and weight loss. Salt restriction advised. She is in the house all day reading her books. She does not perform any regular exercise. I advised her that it is important to be active and perform regular exercise in order to help lose weight and better blood pressure control. TIA. MRA Brain 7/31/16: Occluded intradural R vertebral artery. Thank you for allowing me to participate in patient care. Please feel free to call me if you have any question or concern. Simi Amos MD  Please note: This document has been produced using voice recognition software. Unrecognized errors in transcription may be present.

## 2022-09-01 NOTE — LETTER
9/1/2022    Patient: Delphine Smoker   YOB: 1955   Date of Visit: 9/1/2022     Concha Farrell MD  [facility] [address]  [address]  [facility] [address] [id number]  Via Fax: [phone]    Dear Concha Farrell MD,      Thank you for referring Ms. Gayla Shepherd to [address] [place] [address] for evaluation. My notes for this consultation are attached. If you have questions, please do not hesitate to call me. I look forward to following your patient along with you.       Sincerely,    Marichuy Clarke MD

## 2022-09-01 NOTE — PROGRESS NOTES
Identified pt with two pt identifiers(name and ). Reviewed record in preparation for visit and have obtained necessary documentation. Juliana Lanier presents today for   Chief Complaint   Patient presents with    Follow-up     htn       Pt c/o  SOB and  HEADACHES. Gayla Shepherd preferred language for health care discussion is english/other. Personal Protective Equipment:   Personal Protective Equipment was used including: mask-surgical and hands-gloves. Patient was placed on no precaution(s). Patient was masked. Precautions:   Patient currently on None  Patient currently roomed with door closed. Is someone accompanying this pt? no    Is the patient using any DME equipment during [address]? no    Depression Screening:  3 most recent PHQ Screens 2022   Little interest or pleasure in doing things Not at all   Feeling down, depressed, irritable, or hopeless Not at all   Total Score PHQ 2 0       Learning Assessment:  No flowsheet data found. Abuse Screening:  Abuse Screening Questionnaire 2022   Do you ever feel afraid of your partner? N   Are you in a relationship with someone who physically or mentally threatens you? N   Is it safe for you to go home? Y       Fall Risk  Fall Risk Assessment, last 12 mths 2022   Able to walk? Yes   Fall in past 12 months? 0   Do you feel unsteady? 0   Are you worried about falling 0       Pt currently taking Anticoagulant therapy? no  Pt currently taking Antiplatelet therapy? yes    Coordination of Care:  1. Have you been to the ER, urgent care clinic since your last visit? Hospitalized since your last visit? no    2. Have you seen or consulted any other health care providers outside of the [address] [place] [address] since your last visit? Include any pap smears or colon screening. neurology      Please see Red banners under Allergies and Med Rec to remove outside inquires. All correct information has been verified with patient and added to chart. Medication's patient's would liked removed has been marked not taking to be removed per Verbal order and read back per Dianna Sotomayor MD

## 2022-09-07 NOTE — TELEPHONE ENCOUNTER
Patient states that her BP is extremely elevated. Patient states that kelseys Hallie Angeles took her off kept it under control and now that he's taken off of it She says BP is running at  168/98, 148/91 and higher. Patient states that she is afraid she might have a stroke.  Says that what she been told to do is not working

## 2022-09-07 NOTE — TELEPHONE ENCOUNTER
Contacted pt at [facility]. Two patient Identifiers confirmed. Pt stated her high blood pressures have been giving her anxiety, stated she feels like she will have another stroke. Pt denies chest pain, SOB, dizziness, or headaches. Confirmed that pt is taking propanolol 20mg twice daily, and lisinopril 15mg twice daily. Pt had previously put herself back on nifedipine, but stopped after being advised by neurologist. Pulse rates have been in 60s, a few low readings of 43 and 57. Pt stated she wants to go back on nifedipine or something similar because she is worried about her blood pressure. Advised pt will review with Dr. Sherif Barbosa and advise. Advised pt to go to nearest ER if she experiences any chest pain, SOB, or stroke symptoms. Pt verbalized understanding.

## 2022-09-09 NOTE — TELEPHONE ENCOUNTER
Contacted pt at [facility]. Two patient Identifiers confirmed. Advised pt per Dr Hwang Staff. Pt stated \"Is Dr. Hwang Staff trying to kill me\", and expressed concern that he is not putting her back on nifedipine. Pt stated nifedipine made her dizzy. Pt stated she is concerned about her high blood pressure and stated \"if it is not fixed by the end of the month she will find a new cardiologist\". Pharmacy confirmed with pt and advised pt on increase in propanolol. Advised pt to keep taking Bps and go to nearest ER if she has any high readings accompanied by chest pain/SOB. Pt verbalized understanding.

## 2022-09-09 NOTE — TELEPHONE ENCOUNTER
PCP: Anurag Mcdonald MD    Last appt: 9/1/2022  Future Appointments   Date Time Provider Lalo Espino   12/13/2022  9:30 AM Roberto Taylor MD [facility] BS AMB       Requested Prescriptions     Pending Prescriptions Disp Refills    propranoloL (INDERAL) 40 mg tablet 180 Tablet 3     Sig: Take 1 Tablet by mouth two (2) times a day.

## 2022-09-10 RX ORDER — PROPRANOLOL HYDROCHLORIDE 40 MG/1
40 TABLET ORAL 2 TIMES DAILY
Qty: 180 TABLET | Refills: 3 | Status: SHIPPED | OUTPATIENT
Start: 2022-09-10

## 2022-09-13 ENCOUNTER — HOSPITAL ENCOUNTER (OUTPATIENT)
Dept: LAB | Age: 67
Discharge: HOME | End: 2022-09-13

## 2022-09-13 LAB — SENTARA SPECIMEN COL,SENBCF: NORMAL

## 2022-09-13 PROCEDURE — 99001 SPECIMEN HANDLING PT-LAB: CPT

## 2022-09-14 ENCOUNTER — TELEPHONE (OUTPATIENT)
Dept: CARDIOLOGY CLINIC | Age: 67
End: 2022-09-14

## 2022-09-14 LAB
ANION GAP SERPL CALC-SCNC: 10 MMOL/L (ref 3–15)
BUN SERPL-MCNC: 15 MG/DL (ref 6–22)
CALCIUM SERPL-MCNC: 9.4 MG/DL (ref 8.4–10.5)
CHLORIDE SERPL-SCNC: 105 MMOL/L (ref 98–110)
CO2 SERPL-SCNC: 28 MMOL/L (ref 20–32)
CREAT SERPL-MCNC: 1 MG/DL (ref 0.8–1.4)
GLOMERULAR FILTRATION RATE: 58.9 ML/MIN/1.73 SQ.M.
GLUCOSE SERPL-MCNC: 87 MG/DL (ref 70–99)
POTASSIUM SERPL-SCNC: 4.2 MMOL/L (ref 3.5–5.5)
SODIUM SERPL-SCNC: 143 MMOL/L (ref 133–145)

## 2022-09-14 NOTE — TELEPHONE ENCOUNTER
Contacted pt at [facility]. Two patient Identifiers confirmed. Advised pt per Dr Melinda Chaudhari. Pt stated she is not going to increase her propanolol, pt expressed frustration that \"Dr. Melinda Chaudhari still didn't find another medication to replace her nifedipine\". Pt stated \"starting tonight she is going back on her nifedipine 30mg daily and taking 10mg Inderal twice daily and 10mg lisinopril twice daily. I know I'm not supposed to self medicate but I've lost confidence in Dr. Melinda Chaudhari and need to get my pressure down\". Advised pt to keep logging blood pressures and monitor for any cardiac/stroke symptoms and go to ER if any symptoms develop. Pt verbalized understanding.

## 2022-09-14 NOTE — TELEPHONE ENCOUNTER
Verbal order and read back per Anali Solano MD  Increase propanolol to 60mg bid, continue lisinopril 20mg bid

## 2022-10-18 DIAGNOSIS — I10 HYPERTENSION, UNSPECIFIED TYPE: ICD-10-CM

## 2022-11-18 NOTE — PATIENT DISCUSSION
Educated patient on findings and conditions. Continue Refresh MEGA3 4-6x/day OU and gel drops or asia qhs OU. Begin warm compresses QD-BID OU. Advised to call/RTC if si/sx persist or worsen. Monitor.

## 2023-03-16 ENCOUNTER — OFFICE VISIT (OUTPATIENT)
Age: 68
End: 2023-03-16

## 2023-03-16 VITALS
RESPIRATION RATE: 18 BRPM | OXYGEN SATURATION: 99 % | WEIGHT: 227 LBS | HEART RATE: 95 BPM | BODY MASS INDEX: 36.48 KG/M2 | HEIGHT: 66 IN

## 2023-03-16 DIAGNOSIS — Z96.651 HISTORY OF TOTAL RIGHT KNEE REPLACEMENT: ICD-10-CM

## 2023-03-16 DIAGNOSIS — M16.12 PRIMARY OSTEOARTHRITIS OF LEFT HIP: ICD-10-CM

## 2023-03-16 DIAGNOSIS — Z86.73 HISTORY OF CVA IN ADULTHOOD: ICD-10-CM

## 2023-03-16 DIAGNOSIS — E66.9 CLASS 2 OBESITY WITH BODY MASS INDEX (BMI) OF 36.0 TO 36.9 IN ADULT, UNSPECIFIED OBESITY TYPE, UNSPECIFIED WHETHER SERIOUS COMORBIDITY PRESENT: ICD-10-CM

## 2023-03-16 DIAGNOSIS — M25.562 CHRONIC PAIN OF LEFT KNEE: Primary | ICD-10-CM

## 2023-03-16 DIAGNOSIS — Z96.652 HISTORY OF LEFT KNEE REPLACEMENT: ICD-10-CM

## 2023-03-16 DIAGNOSIS — G89.29 CHRONIC PAIN OF LEFT KNEE: Primary | ICD-10-CM

## 2023-03-16 DIAGNOSIS — M53.3 SACROILIAC JOINT DYSFUNCTION OF LEFT SIDE: ICD-10-CM

## 2023-03-16 PROBLEM — E66.812 CLASS 2 OBESITY WITH BODY MASS INDEX (BMI) OF 36.0 TO 36.9 IN ADULT: Status: ACTIVE | Noted: 2023-03-16

## 2023-03-16 RX ORDER — MELOXICAM 15 MG/1
15 TABLET ORAL DAILY
Qty: 30 TABLET | Refills: 2 | Status: SHIPPED | OUTPATIENT
Start: 2023-03-16 | End: 2023-03-16 | Stop reason: SINTOL

## 2023-03-16 RX ORDER — ACETAMINOPHEN 500 MG
1000 TABLET ORAL EVERY 8 HOURS
Qty: 180 TABLET | Refills: 0 | Status: SHIPPED | OUTPATIENT
Start: 2023-03-16 | End: 2023-04-15

## 2023-03-16 RX ORDER — LIDOCAINE 50 MG/G
1 PATCH TOPICAL DAILY
Qty: 30 PATCH | Refills: 0 | Status: SHIPPED | OUTPATIENT
Start: 2023-03-16 | End: 2023-04-15

## 2023-03-16 RX ORDER — MELOXICAM 15 MG/1
15 TABLET ORAL DAILY
Qty: 30 TABLET | Refills: 2 | Status: SHIPPED | OUTPATIENT
Start: 2023-03-16 | End: 2023-06-14

## 2023-03-16 NOTE — ASSESSMENT & PLAN NOTE
79-year-old female with left hip pain. It is unclear whether it is coming from the abdominal area or the hip joint. She has had pelvic floor physical therapy which did not give significant relief. She uses lidocaine patches and Aspercreme and this mostly applied to the abdominal area. She does point to some pain in the area of the TFL. She had minimal pain with range of motion of the left hip but with internal rotation she did get pain again in the TFL area. In order to rule in or rule out hip osteoarthritis I will have her get her intra-articular injection under fluoroscopic guidance of corticosteroids. I also referred her to Dr. Gwen Frank for evaluation for sacroiliac joint pain because she did have some tenderness in that area as well. She also has some symptoms running down the back of her left leg down to the area of the knee. I also prescribed her some Mobic, Voltaren gel and Tylenol to try and relieve her pain so she has she can take less of her narcotic medication. We will have her see physical therapy for her hip as well. I will see her back in 3 months.

## 2023-03-16 NOTE — PROGRESS NOTES
Patient: Cedrick Benavides                MRN: [id number]       SSN: [id number]  YOB: 1955        AGE: 75 y.o. SEX: female    PCP: Chelsea Rodriguez MD  03/16/23    Chief Complaint: Knee Pain (Left knee pain) and Hip Pain (Left hip )      Cedrick Benavides is a 75 y.o. female  1. Chronic pain of left knee  -     AMB POC X-RAY KNEE 3 VIEW  2. Primary osteoarthritis of left hip  Assessment & Plan:  50-year-old female with left hip pain. It is unclear whether it is coming from the abdominal area or the hip joint. She has had pelvic floor physical therapy which did not give significant relief. She uses lidocaine patches and Aspercreme and this mostly applied to the abdominal area. She does point to some pain in the area of the TFL. She had minimal pain with range of motion of the left hip but with internal rotation she did get pain again in the TFL area. In order to rule in or rule out hip osteoarthritis I will have her get her intra-articular injection under fluoroscopic guidance of corticosteroids. I also referred her to Dr. Freeman Morgan for evaluation for sacroiliac joint pain because she did have some tenderness in that area as well. She also has some symptoms running down the back of her left leg down to the area of the knee. I also prescribed her some Mobic, Voltaren gel and Tylenol to try and relieve her pain so she has she can take less of her narcotic medication. We will have her see physical therapy for her hip as well. I will see her back in 3 months. Orders:  -     AMB POC X-RAY RADEX HIP UNI WITH PELVIS 2-3 VIEWS  -     Ambulatory referral to Physical Therapy  -     XR Fluoroscopy Guided Localize Injection; Future  -     acetaminophen (TYLENOL) 500 MG tablet; Take 2 tablets by mouth in the morning and 2 tablets at noon and 2 tablets in the evening., Disp-180 tablet, R-0Normal  -     diclofenac sodium (VOLTAREN) 1 % GEL;  Apply 4 g topically 4 times daily, Topical, 4 TIMES DAILY Starting Thu 3/16/2023, Until Wed 6/14/2023, For 90 days, Disp-350 g, R-5, Normal  -     lidocaine (LIDODERM) 5 %; Place 1 patch onto the skin daily 12 hours on, 12 hours off., Disp-30 patch, R-0Normal  -     meloxicam (MOBIC) 15 MG tablet; Take 1 tablet by mouth daily, Disp-30 tablet, R-2Normal  3. Sacroiliac joint dysfunction of left side  -     [facility], [facility], [place] ([address] [place] [address])  4. Class 2 obesity with body mass index (BMI) of 36.0 to 36.9 in adult, unspecified obesity type, unspecified whether serious comorbidity present  5. History of left knee replacement  6. History of total right knee replacement  7. History of CVA in adulthood      HPI: 26-year-old female with a history of bilateral knee arthroplasties in 2011 2013. She has also had a hypertensive stroke that affected her left side in between those 2 knee replacements. She does not have any pain in either of her knees but her right knee replacement is more of a success than her left and her opinion. She has pain in her hip and abdominal region for which she has had pelvic floor therapy and seen her gynecologist she has had a hernia ruled out. AMB PAIN ASSESSMENT 3/16/2023   Location of Pain Knee   Location Modifiers Left   Severity of Pain 0     Tobacco Use: Low Risk     Smoking Tobacco Use: Never    Smokeless Tobacco Use: Never    Passive Exposure: Not on file           PHYSICAL EXAMINATION:  Pulse 95   Resp 18   Ht 5' 6\" (1.676 m)   Wt 227 lb (103 kg)   SpO2 99%   BMI 36.64 kg/m²   Body mass index is 36.64 kg/m². Wt Readings from Last 3 Encounters:   03/16/23 227 lb (103 kg)   09/01/22 222 lb (100.7 kg)   08/05/22 222 lb (100.7 kg)       GENERAL: Alert and oriented x3, in no acute distress. HEENT: Normocephalic, atraumatic. MSK: Left Knee Exam     Tenderness   The patient is experiencing tenderness in the medial joint line and patella.     Range of Motion   Extension:  0   Flexion:  120 Tests   Varus: positive Valgus: negative  Drawer:  Anterior - negative     Posterior - negative    Other   Erythema: absent  Sensation: normal  Pulse: present  Swelling: mild  Effusion: effusion present    Comments: With very stressed the knee there is some opening on the lateral side. Medial side is stable. There is no erythema and no effusion and no pain with range of motion. Left Hip Exam     Tenderness   The patient is experiencing tenderness in the lateral.    Range of Motion   Flexion:  120   External rotation:  50   Internal rotation: 20     Muscle Strength   Abduction: 5/5   Adduction: 5/5   Flexion: 5/5     Other   Erythema: absent  Scars: absent  Sensation: normal  Pulse: present    Comments:  Tenderness palpation near the ASIS and just medial in the lower abdominal wall as well as in the TFL muscle. There is also tenderness over the posterior superior iliac spine. There is no tenderness over the greater trochanter. IMAGING:  Imaging read by myself and interpreted as follows:    March 16, 2023:  3 view x-ray of the left hip including AP pelvis and AP and lateral of the left hip shows minimal bony pathology. Joint spaces well-preserved there is small osteophyte off the lateral acetabulum. There are some slight calcification near the greater trochanter and ASIS. March 16, 2023:  4 view x-ray of the left knee including AP, lateral, sunrise and notch views demonstrates a well-positioned, cemented left total knee arthroplasty with no signs of loosening or lucency. Past Medical History:   Diagnosis Date    Adverse effect of anesthesia     pt states very resistant to sedation and numbing     Arthritis     Asthma 04/14/2022    NEW ONSET    Hernia, abdominal     Hypertension     Stroke ([facility] [address] 5/2012       No family history on file.     Current Outpatient Medications   Medication Sig Dispense Refill    acetaminophen (TYLENOL) 500 MG tablet Take 2 tablets by mouth in the morning and 2 tablets at noon and 2 tablets in the evening. 180 tablet 0    diclofenac sodium (VOLTAREN) 1 % GEL Apply 4 g topically 4 times daily 350 g 5    lidocaine (LIDODERM) 5 % Place 1 patch onto the skin daily 12 hours on, 12 hours off. 30 patch 0    meloxicam (MOBIC) 15 MG tablet Take 1 tablet by mouth daily 30 tablet 2    aspirin 81 MG chewable tablet Take 81 mg by mouth daily      atorvastatin (LIPITOR) 40 MG tablet Take 1 tablet by mouth      diazePAM (VALIUM) 5 MG tablet Take 5 mg by mouth every 6 hours as needed. HYDROcodone-acetaminophen (NORCO) 5-325 MG per tablet ONE TABLET TWICE A DAY AS NEEDED FOR PAIN      lisinopril (PRINIVIL;ZESTRIL) 20 MG tablet Take by mouth 2 times daily      propranolol (INDERAL) 40 MG tablet Take 40 mg by mouth 2 times daily      tiotropium (SPIRIVA RESPIMAT) 1.25 MCG/ACT AERS inhaler Inhale 2 puffs into the lungs daily       No current facility-administered medications for this visit.         Allergies   Allergen Reactions    Amlodipine Other (See Comments)     Studdering and can't speak    Nsaids Other (See Comments)     Pt states that it raises her bp    Prednisone Other (See Comments)     Increases BP       Past Surgical History:   Procedure Laterality Date    COLONOSCOPY N/A 1/13/2020    DIAGNOSTIC COLONOSCOPY /c Bx Polypectomy performed by Ayaka Paz MD at [address]  2004    hysterectomy    HERNIA REPAIR      ORTHOPEDIC SURGERY  2011,2013    Both knees replaced       Social History     Socioeconomic History    Marital status:      Spouse name: Not on file    Number of children: Not on file    Years of education: Not on file    Highest education level: Not on file   Occupational History    Not on file   Tobacco Use    Smoking status: Never    Smokeless tobacco: Never   Substance and Sexual Activity    Alcohol use: No    Drug use: No    Sexual activity: Not on file   Other Topics Concern    Not on file   Social History Narrative    Not on file Social Determinants of Health     Financial Resource Strain: Not on file   Food Insecurity: Not on file   Transportation Needs: Not on file   Physical Activity: Not on file   Stress: Not on file   Social Connections: Not on file   Intimate Partner Violence: Not on file   Housing Stability: Not on file       REVIEW OF SYSTEMS:      No changes from previous review of systems unless noted. Prescription medication management discussed with patient. Electronically signed by: Yordy Ramírez DO    Note: This note was completed using voice recognition software.   Any typographical/name errors or mistakes are unintentional.

## 2023-03-20 ENCOUNTER — TELEPHONE (OUTPATIENT)
Age: 68
End: 2023-03-20

## 2023-03-27 ENCOUNTER — HOSPITAL ENCOUNTER (OUTPATIENT)
Facility: HOSPITAL | Age: 68
Setting detail: RECURRING SERIES
Discharge: HOME | End: 2023-03-30
Payer: MEDICARE

## 2023-03-27 PROCEDURE — 97162 PT EVAL MOD COMPLEX 30 MIN: CPT

## 2023-03-27 NOTE — PROGRESS NOTES
PT DAILY TREATMENT NOTE/LUMBAR EVAL    Patient Name: See Holding    Date: 3/27/2023    : 1955  Insurance: Payor: Uyen Roper / Plan: Uyen Roper / Product Type: *No Product type* /      Patient  verified yes     Visit #   Current / Total 1 8-12   Time   In / Out 9:36 10:20   Pain   In / Out 4 4   Subjective Functional Status/Changes: See Below   Changes to:  Meds, Allergies, Med Hx, Sx Hx? If yes, update Summary List no  - See Chart     Treatment Area: Primary osteoarthritis of left hip [M16.12]    SUBJECTIVE    []constant []intermittent []improving [x]worsening []no change since onset    Current symptoms/Complaints: chronic recurrent left hip/groin pain \"along bikini line\". Was having issues with breathing, went to cardiologist. Had testing to circulation, no symptoms aside from fluid in behind knee. Went to ortho, was cleared. Got imaging, ortho said hip didn't look that bad at all. Pain radiates from waist to along lower back. Reports it's on her \"stroke side\". Left lower back pain. Reports sometimes feet feel funny but was told that's to be expected from the stroke    ~8 weeks ago went urgent care for stomach pain, was told to go ER, had testing and x-rays and sent home with dx of diverticulosis and a shot of morphine. Was sent to primary, \"didn't say much about nothing\". Has been to GI doctor, was given more medication. Was told it was scar tissue from sx and the mesh in the stomach. Also told she had diverticulosis and too fat, backed up fecal matter. What produces or worsens:lying on left side. Standing/walking, stairs, rising from sitting. What stops or reduces:bending over backwards    Continued use makes the pain:  [] Better []  Worse []  No effect     Pain at rest: [] No    [x] Yes       PMHx/Surgical Hx: Hx hernia sx 18 years ago. Had hernia on right side, but mesh is on left, uknown reason why. Also has scar tissue from abdominoplasty 18 years ago.     Previous

## 2023-03-27 NOTE — PROGRESS NOTES
[facility] PHYSICAL THERAPY  [address] [place] [address]. [facility] [address] [facility] Phone: [phone] [facility]   Plan of Care / Statement of Necessity for Physical Therapy Services     Patient Name: Erin Brumfield : 1955   Medical   Diagnosis: Primary osteoarthritis of left hip [M16.12] Treatment Diagnosis: M25.551  RIGHT HIP PAIN    Onset Date: 2020 Payor: Payor: Kaitlin Strickland / Plan: Kaitlin Strickland / Product Type: *No Product type* /    Referral Source: Fatou Peralta DO Start of Care [facility]): 3/27/2023   Prior Hospitalization: See medical history Provider #: [id number]   Prior Level of Function: Independent and working. Comorbidities: Hx CVA, HTN, BTKA, Hx abdominoplasty, hx hernia repair   Medications: Verified on Patient Summary List     Assessment / key information:  Patient is a 75 y.o. female presenting with CC left inguinal pain as well as posterolateral hip pain x3 years. She denies any JEWELL, but reports that she has had several medical personal dx her with possible causes including scar tissue from abdominoplasty and hernia repair as well as pelvic floor dysfunction. Patient reports hx of prior PT that had improved her condition but admits to failure to comply in performing her HEP as she is \"lazy and sits in front of the computer all day reading and playing games\". 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  Patient demonstrates the following objective measures:                         AROM                          PROM                       Strength (1-5)  Hip Left Right Left Right Left Right   Flexion 75 110 110 120 3- 4   Extension 0 10 4 20 3 4   Abduction 45 45 52 55 3 4   Adduction 25 25 30 30 3 4   ER 10 45 55 70 3 4   IR 8 15 12 20 3 4    Pt  will benefit from physical therapist management to address her impairments (listed below),  educate her, and

## 2023-04-10 ENCOUNTER — APPOINTMENT (OUTPATIENT)
Facility: HOSPITAL | Age: 68
End: 2023-04-10
Payer: MEDICARE

## 2023-04-17 ENCOUNTER — HOSPITAL ENCOUNTER (OUTPATIENT)
Facility: HOSPITAL | Age: 68
Setting detail: RECURRING SERIES
Discharge: HOME | End: 2023-04-20
Payer: MEDICARE

## 2023-04-17 PROCEDURE — 97535 SELF CARE MNGMENT TRAINING: CPT

## 2023-04-17 PROCEDURE — 97110 THERAPEUTIC EXERCISES: CPT

## 2023-04-17 PROCEDURE — 97530 THERAPEUTIC ACTIVITIES: CPT

## 2023-04-17 NOTE — PROGRESS NOTES
[place]   4/19/2023 11:20 AM Kim Quintanilla PTA [facility]   4/24/2023 11:20 AM Judd Todd PTA [facility]   4/26/2023 11:20 AM [facility]   5/11/2023 10:00 AM Martínez Ortega MD [facility]

## 2023-04-19 ENCOUNTER — APPOINTMENT (OUTPATIENT)
Facility: HOSPITAL | Age: 68
End: 2023-04-19
Payer: MEDICARE

## 2023-04-24 ENCOUNTER — HOSPITAL ENCOUNTER (OUTPATIENT)
Facility: HOSPITAL | Age: 68
Setting detail: RECURRING SERIES
Discharge: HOME | End: 2023-04-27
Payer: MEDICARE

## 2023-04-24 PROCEDURE — 97140 MANUAL THERAPY 1/> REGIONS: CPT

## 2023-04-24 PROCEDURE — 97112 NEUROMUSCULAR REEDUCATION: CPT

## 2023-04-24 PROCEDURE — 97110 THERAPEUTIC EXERCISES: CPT

## 2023-04-26 ENCOUNTER — HOSPITAL ENCOUNTER (OUTPATIENT)
Facility: HOSPITAL | Age: 68
Setting detail: RECURRING SERIES
Discharge: HOME | End: 2023-04-29
Payer: MEDICARE

## 2023-04-26 PROCEDURE — 97140 MANUAL THERAPY 1/> REGIONS: CPT

## 2023-04-26 PROCEDURE — 97110 THERAPEUTIC EXERCISES: CPT

## 2023-04-26 PROCEDURE — 97530 THERAPEUTIC ACTIVITIES: CPT

## 2023-04-26 NOTE — PROGRESS NOTES
PHYSICAL / OCCUPATIONAL THERAPY - DAILY TREATMENT NOTE (updated )    Patient Name: Khalif Whatley    Date: 2023    : 1955  Insurance: Payor: Gabe Chanteky / Plan: Gabe Dutton / Product Type: *No Product type* /      Patient  verified yes     Visit #   Current / Total 6 8-12   Time   In / Out 11:20 12:00   Pain   In / Out 2 1   Subjective Functional Status/Changes: Pt reports the abdominal pain was more constant today, states she was up at 6 AM and she tried to lay on her stomach and do the stretches with a heating pad and it helped some, but she did take pain medication before coming to PT. Pt states that her ortho doctor is recommending her to another doctor who will see if she can do a cortisone shot. Changes to:  Meds, Allergies, Med Hx, Sx Hx? If yes, update Summary List no       TREATMENT AREA =  Primary osteoarthritis of left hip [M16.12]    OBJECTIVE    Therapeutic Procedures: Tx Min Billable or 1:1 Min (if diff from Tx Min) Procedure, Rationale, Specifics   15  06283 Therapeutic Exercise (timed):  increase ROM, strength, coordination, balance, and proprioception to improve patient's ability to progress to PLOF and address remaining functional goals. (see flow sheet as applicable)     Details if applicable:       15  43688 Therapeutic Activity (timed):  use of dynamic activities replicating functional movements to increase ROM, strength, coordination, balance, and proprioception in order to improve patient's ability to progress to PLOF and address remaining functional goals. (see flow sheet as applicable)     Details if applicable:     10  16519 Manual Therapy (timed):  increase ROM, increase tissue extensibility, and decrease trigger points to improve patient's ability to progress to PLOF and address remaining functional goals. The manual therapy interventions were performed at a separate and distinct time from the therapeutic activities interventions .  (see flow sheet

## 2023-05-01 ENCOUNTER — HOSPITAL ENCOUNTER (OUTPATIENT)
Facility: HOSPITAL | Age: 68
Setting detail: RECURRING SERIES
Discharge: HOME | End: 2023-05-04
Payer: MEDICARE

## 2023-05-01 PROCEDURE — 97530 THERAPEUTIC ACTIVITIES: CPT

## 2023-05-01 PROCEDURE — 97110 THERAPEUTIC EXERCISES: CPT

## 2023-05-01 PROCEDURE — 97140 MANUAL THERAPY 1/> REGIONS: CPT

## 2023-05-01 NOTE — PROGRESS NOTES
[facility] AT [place] [address]   Phone: [phone] Fax: [phone]  PROGRESS NOTE  Patient Name: Josselin Iglesias : 1955   Treatment/Medical Diagnosis:  Primary osteoarthritis of left hip [M16.12]   Referral Source: Twila Wolf DO     Date of Initial Visit: 3/27/23 Attended Visits: 7 Missed Visits: 0     SUMMARY OF TREATMENT  Patient's treatment has consisted of core and LE strengthening, postural education, extension based ex, manual to address soft tissue restrictions and instruction in home exercise program.    CURRENT STATUS  Patient to be adherent to HEP to facilitate pain control with ADL's.  -Status at IE- HEP initiated. Current: MET, Pt reports daily compliance   2. Patient to report > 70% improvement in sleep interrupted by left hip pain.  -Status at IE- sleep interrupted throughout the night. Current: NOT MET   3. Patient to report no postural deformity upon rising from seated position  -Status at IE- flexion deformity after sitting at computer. Current: MET  4. Patient to demonstrate normalized gait. -Status at IE- antalgic gait. Current: NOT MET, pt demo's with antalgic gait pattern intermittently   5. Patient to demonstrate no difficulty performing bed mobility.   -Status at IE- patient demonstrates moderate difficulty with pain in transfers/mobility  Current: NOT MET, pain with left S/L and intermittent with movement. Pt progressing well with exercises in clinic;however, subjectively reports minimal change in left sided pain with ADL's. Pt does respond well to extension based ex (prone and repeated lumbar extension in prone or standing) so she is managing her pain with repeated movement;  however, there is poor carry over with pain reduction with ADL's. Pt demo's improved postural awareness with ADL's, but continues to have pain with bed mobility and movement such as sit<>stand.

## 2023-05-01 NOTE — PROGRESS NOTES
PHYSICAL / OCCUPATIONAL THERAPY - DAILY TREATMENT NOTE (updated )    Patient Name: Brock Bello    Date: 2023    : 1955  Insurance: Payor: Joce Ricketts / Plan: Joce Ricketts / Product Type: *No Product type* /      Patient  verified yes     Visit #   Current / Total 7 8-12   Time   In / Out 10:41 11:21   Pain   In / Out 2 1   Subjective Functional Status/Changes: Pt reports still has left sided pain during the night that affects her sleep, laying on her left side is the worst and sometimes causes pain to radiate from the back of her left thigh up into her left buttocks. Pt reports she is supposed to see another specialist soon about the hip. Pt states laying on her stomach does help temporarily and feels better after she does her press ups. Pt reports position of comfort is seated at her computer with legs elevated on a stool. Changes to:  Meds, Allergies, Med Hx, Sx Hx? If yes, update Summary List no       TREATMENT AREA =  Primary osteoarthritis of left hip [M16.12]    OBJECTIVE    Therapeutic Procedures: Tx Min Billable or 1:1 Min (if diff from Tx Min) Procedure, Rationale, Specifics   20  17277 Therapeutic Exercise (timed):  increase ROM, strength, coordination, balance, and proprioception to improve patient's ability to progress to PLOF and address remaining functional goals. (see flow sheet as applicable)     Details if applicable:       10  45908 Therapeutic Activity (timed):  use of dynamic activities replicating functional movements to increase ROM, strength, coordination, balance, and proprioception in order to improve patient's ability to progress to PLOF and address remaining functional goals. (see flow sheet as applicable)     Details if applicable:     10  44816 Manual Therapy (timed):  increase ROM, increase tissue extensibility, and decrease trigger points to improve patient's ability to progress to PLOF and address remaining functional goals.   The manual therapy

## 2023-05-04 ENCOUNTER — HOSPITAL ENCOUNTER (OUTPATIENT)
Facility: HOSPITAL | Age: 68
Setting detail: RECURRING SERIES
Discharge: HOME | End: 2023-05-07
Payer: MEDICARE

## 2023-05-04 PROCEDURE — 97530 THERAPEUTIC ACTIVITIES: CPT

## 2023-05-04 PROCEDURE — 97110 THERAPEUTIC EXERCISES: CPT

## 2023-05-04 PROCEDURE — 97140 MANUAL THERAPY 1/> REGIONS: CPT

## 2023-05-08 ENCOUNTER — APPOINTMENT (OUTPATIENT)
Facility: HOSPITAL | Age: 68
End: 2023-05-08
Payer: MEDICARE

## 2023-05-11 ENCOUNTER — HOSPITAL ENCOUNTER (OUTPATIENT)
Facility: HOSPITAL | Age: 68
Setting detail: RECURRING SERIES
Discharge: HOME | End: 2023-05-14
Payer: MEDICARE

## 2023-05-11 ENCOUNTER — OFFICE VISIT (OUTPATIENT)
Age: 68
End: 2023-05-11
Payer: MEDICARE

## 2023-05-11 VITALS — BODY MASS INDEX: 36.16 KG/M2 | HEIGHT: 66 IN | WEIGHT: 225 LBS

## 2023-05-11 DIAGNOSIS — M54.42 CHRONIC LEFT-SIDED LOW BACK PAIN WITH LEFT-SIDED SCIATICA: Primary | ICD-10-CM

## 2023-05-11 DIAGNOSIS — G89.29 CHRONIC LEFT-SIDED LOW BACK PAIN WITH LEFT-SIDED SCIATICA: Primary | ICD-10-CM

## 2023-05-11 DIAGNOSIS — M25.552 LEFT HIP PAIN: ICD-10-CM

## 2023-05-11 PROCEDURE — 97110 THERAPEUTIC EXERCISES: CPT

## 2023-05-11 PROCEDURE — 1123F ACP DISCUSS/DSCN MKR DOCD: CPT | Performed by: PHYSICAL MEDICINE & REHABILITATION

## 2023-05-11 PROCEDURE — 99204 OFFICE O/P NEW MOD 45 MIN: CPT | Performed by: PHYSICAL MEDICINE & REHABILITATION

## 2023-05-11 PROCEDURE — 97140 MANUAL THERAPY 1/> REGIONS: CPT

## 2023-05-11 RX ORDER — ALBUTEROL SULFATE 90 UG/1
AEROSOL, METERED RESPIRATORY (INHALATION)
COMMUNITY
Start: 2023-04-11 | End: 2023-05-11

## 2023-05-11 RX ORDER — NIFEDIPINE 30 MG/1
30 TABLET, FILM COATED, EXTENDED RELEASE ORAL DAILY
COMMUNITY

## 2023-05-11 RX ORDER — LISINOPRIL 5 MG/1
5 TABLET ORAL DAILY
COMMUNITY
Start: 2023-05-04

## 2023-05-11 RX ORDER — FLUTICASONE PROPIONATE 44 MCG
AEROSOL WITH ADAPTER (GRAM) INHALATION
COMMUNITY
Start: 2023-04-25

## 2023-05-11 NOTE — PROGRESS NOTES
Negative  Gluteal:Negative  SI Joint:  Negative- mildly tender left  Lumbar paraspinals or spinous processes: Positive    ROM:   Hip Instability: None   Hip ROM: Normal  Lumbar ROM: No reproduction of pain with movement     Special Tests    Stinchfield: Negative  Log Roll: Negative  Scour:  Negative  ELEANOR: Negative  FADIR: Negative  Chente's:Negative  SI joint compression: Negative  Tender over the scar tissue from abdominoplasty      Slump test, femoral stretch, SLR: Negative      Medical Decision Making:    Images: The imaging results as well as the actual images of the studies below were reviewed, visualized and interpreted by me. Labs: The results below were reviewed. Assessment:   - chronic LLQ pain  -lumbar spondylosis with radiation down the left leg    Plan:      -Physical therapy -  continue PT  -Medications - cont lidocaine patch. Counseled regarding side effects and appropriate administration of medications.    -Diagnostics/Imaging - MRI lumbar spine evaluate for left foraminal narrowing    -Injections - Consider diagnostic injection along scar tissue   -Education - The patient's diagnosis, prognosis and treatment options were discussed today. All questions were answered. F/U - after MRI lumbar spine or sooner if needed.   Consider ultrasound left hip         Martínez Dyson [facility]

## 2023-05-11 NOTE — PATIENT INSTRUCTIONS
[facility] Radiology    Please expect an automated call within 24-48 business hours to schedule your outpatient study with [employer]    If you have not received an automated call, please call [phone] to speak directly with a     [address] at [address]

## 2023-05-15 ENCOUNTER — APPOINTMENT (OUTPATIENT)
Facility: HOSPITAL | Age: 68
End: 2023-05-15
Payer: MEDICARE

## 2023-05-16 ENCOUNTER — HOSPITAL ENCOUNTER (OUTPATIENT)
Facility: HOSPITAL | Age: 68
Setting detail: RECURRING SERIES
Discharge: HOME | End: 2023-05-19
Payer: MEDICARE

## 2023-05-16 PROCEDURE — 97140 MANUAL THERAPY 1/> REGIONS: CPT

## 2023-05-16 PROCEDURE — 97535 SELF CARE MNGMENT TRAINING: CPT

## 2023-05-16 PROCEDURE — 97110 THERAPEUTIC EXERCISES: CPT

## 2023-05-16 NOTE — PROGRESS NOTES
address functional mobility deficits, analyze and address ROM deficits, analyze and address strength deficits, analyze and address soft tissue restrictions, analyze and cue for proper movement patterns, and instruct in home and community integration to address functional deficits and attain remaining goals. Progress toward goals / Updated goals:  []  See Progress Note/Recertification    Limited HEP compliance. Progressing in ability to centralize and abolish symptoms. PLAN  yes Continue plan of care  []  Upgrade activities as tolerated  []  Discharge: See DC Note  []  Other:    [facility] \"BJ\" Sridhar, DPT, Cert. MDT, Cert. DN, Cert. SMT, Dip.  Osteopractic    5/16/2023    12:38 PM    Future Appointments   Date Time Provider Bethany Barton   5/16/2023 12:40 PM Peder Schaumann, PT [facility]   5/18/2023 11:20 AM Peder Schaumann, PT [facility]   5/22/2023 12:40 PM Peder Schaumann, PT [facility]   5/25/2023  1:20 PM Leslee Carrasco PTA [facility]   5/30/2023 11:20 AM Halie Naqvi PTA [facility]   6/22/2023 11:30 AM Martínez Velarde MD [facility]

## 2023-05-22 ENCOUNTER — HOSPITAL ENCOUNTER (OUTPATIENT)
Facility: HOSPITAL | Age: 68
Setting detail: RECURRING SERIES
Discharge: HOME | End: 2023-05-25
Payer: MEDICARE

## 2023-05-22 PROCEDURE — 97112 NEUROMUSCULAR REEDUCATION: CPT

## 2023-05-22 PROCEDURE — 97110 THERAPEUTIC EXERCISES: CPT

## 2023-05-22 NOTE — PROGRESS NOTES
PHYSICAL / OCCUPATIONAL THERAPY - DAILY TREATMENT NOTE (updated )    Patient Name: Benjamin Person    Date: 2023    : 1955  Insurance: Payor: Christiano Fernandez / Plan: Christiano Fernandez / Product Type: *No Product type* /      Patient  verified yes     Visit #   Current / Total 11 12-18   Time   In / Out 12:38 1:16   Pain   In / Out 0-1 0   Subjective Functional Status/Changes: I'm sorry I missed last time, I overslept. But the good news is I ended up having a whole day without any pain at all. I paid for it the next day, though. But I've been doing my exercises and am feeling good. I haven't had any pain today. Changes to:  Meds, Allergies, Med Hx, Sx Hx? If yes, update Summary List no       TREATMENT AREA =  Primary osteoarthritis of left hip [M16.12]    OBJECTIVE    Therapeutic Procedures: Tx Min Billable or 1:1 Min (if diff from Tx Min) Procedure, Rationale, Specifics   39  93605 Therapeutic Exercise (timed):  increase ROM, strength, coordination, balance, and proprioception to improve patient's ability to progress to PLOF and address remaining functional goals. (see flow sheet as applicable)     Details if applicable:         12323 Therapeutic Activity (timed):  use of dynamic activities replicating functional movements to increase ROM, strength, coordination, balance, and proprioception in order to improve patient's ability to progress to PLOF and address remaining functional goals. (see flow sheet as applicable)     Details if applicable:     8  89844 Neuromuscular Re-Education (timed):  improve balance, coordination, kinesthetic sense, posture, core stability and proprioception to improve patient's ability to develop conscious control of individual muscles and awareness of position of extremities in order to progress to PLOF and address remaining functional goals.  (see flow sheet as applicable)     Details if applicable:     [address] Reminder: bill using total billable min of

## 2023-05-25 ENCOUNTER — HOSPITAL ENCOUNTER (OUTPATIENT)
Facility: HOSPITAL | Age: 68
Setting detail: RECURRING SERIES
Discharge: HOME | End: 2023-05-28
Payer: MEDICARE

## 2023-05-25 PROCEDURE — 97110 THERAPEUTIC EXERCISES: CPT

## 2023-05-25 PROCEDURE — 97140 MANUAL THERAPY 1/> REGIONS: CPT

## 2023-05-25 PROCEDURE — 97530 THERAPEUTIC ACTIVITIES: CPT

## 2023-05-30 ENCOUNTER — APPOINTMENT (OUTPATIENT)
Facility: HOSPITAL | Age: 68
End: 2023-05-30
Payer: MEDICARE

## 2023-05-30 NOTE — PROGRESS NOTES
[facility] AT [address]  Phone: [phone] Fax [phone]  DISCHARGE SUMMARY  Patient Name: Elijah Perez : 1955   Treatment/Medical Diagnosis: Primary osteoarthritis of left hip [M16.12]   Referral Source: Everton Gutierrez DO     Date of Initial Visit: 3/27/23 Attended Visits: 12 Missed Visits: 2     SUMMARY OF TREATMENT  Patient's treatment has consisted of core and LE strengthening, postural education, extension based ex, manual to address soft tissue restrictions and instruction in home exercise program.    CURRENT STATUS  Patient to report > 70% improvement in sleep interrupted by left hip pain. NOT MET   Patient to increase FS FOTO score to > 49 to indicate increased functional independence. MET=49/100  3. Patient to demonstrate safe stair negotiation in reciprocal pattern. MET with UE DANIEL   4. Patient to demonstrate ability to safely lift/carry > 20# from low position to increase safety in baking. NOT MET      Pt continues with reports of intermittent left hip and abdominal pain with ADL's. Pt has been educated on neutral posture and how to avoid prolonged lumbar flexion with seated positioning for computer activity/watching television. Pt is able to manage her symptoms of hip and abdominal pain with extension based exercise; however, symptoms return with daily activities. Pt demo's improved postural awareness with ADL;s and therapeutic activity. Pt c/o new symptom of pain radiating down left buttocks only with laying on her left side. Pt's Functional Status Summary score improved 14 points since  initial evaluation indicating increased activity tolerance.  Pt has hit a plateau with symptom reduction/management at this time and is following up with her MD.     RECOMMENDATIONS  Patient is independent with current home exercise program and is being discharged at this time with instruction to

## 2023-07-05 ENCOUNTER — HOSPITAL ENCOUNTER (OUTPATIENT)
Facility: HOSPITAL | Age: 68
Discharge: HOME | End: 2023-07-08
Attending: PHYSICAL MEDICINE & REHABILITATION
Payer: COMMERCIAL

## 2023-07-05 DIAGNOSIS — G89.29 CHRONIC LEFT-SIDED LOW BACK PAIN WITH LEFT-SIDED SCIATICA: ICD-10-CM

## 2023-07-05 DIAGNOSIS — M54.42 CHRONIC LEFT-SIDED LOW BACK PAIN WITH LEFT-SIDED SCIATICA: ICD-10-CM

## 2023-07-05 PROCEDURE — 72148 MRI LUMBAR SPINE W/O DYE: CPT

## 2023-07-11 ENCOUNTER — TELEMEDICINE (OUTPATIENT)
Age: 68
End: 2023-07-11
Payer: COMMERCIAL

## 2023-07-11 DIAGNOSIS — M54.42 CHRONIC LEFT-SIDED LOW BACK PAIN WITH LEFT-SIDED SCIATICA: Primary | ICD-10-CM

## 2023-07-11 DIAGNOSIS — G89.29 CHRONIC LEFT-SIDED LOW BACK PAIN WITH LEFT-SIDED SCIATICA: Primary | ICD-10-CM

## 2023-07-11 DIAGNOSIS — M25.552 LEFT HIP PAIN: ICD-10-CM

## 2023-07-11 PROCEDURE — 99442 PR PHYS/QHP TELEPHONE EVALUATION 11-20 MIN: CPT | Performed by: PHYSICAL MEDICINE & REHABILITATION

## 2023-07-11 NOTE — PROGRESS NOTES
within the next 24 hours. The patient was located at Home: [address] [facility] [address] [facility]. The provider was located at [facility] (Appt Dept): [address] [facility], [facility]. [facility],  [address] [place] [address].     Note: not billable if this call serves to triage the patient into an appointment for the relevant concern    Alexander Fernando MD

## 2023-08-10 ENCOUNTER — OFFICE VISIT (OUTPATIENT)
Age: 68
End: 2023-08-10
Payer: COMMERCIAL

## 2023-08-10 VITALS — HEIGHT: 66 IN | OXYGEN SATURATION: 96 % | WEIGHT: 220 LBS | HEART RATE: 94 BPM | BODY MASS INDEX: 35.36 KG/M2

## 2023-08-10 DIAGNOSIS — M43.16 SPONDYLOLISTHESIS OF LUMBAR REGION: ICD-10-CM

## 2023-08-10 DIAGNOSIS — M47.816 FACET ARTHRITIS, DEGENERATIVE, LUMBAR SPINE: Primary | ICD-10-CM

## 2023-08-10 PROCEDURE — 1123F ACP DISCUSS/DSCN MKR DOCD: CPT | Performed by: PHYSICAL MEDICINE & REHABILITATION

## 2023-08-10 PROCEDURE — 99213 OFFICE O/P EST LOW 20 MIN: CPT | Performed by: PHYSICAL MEDICINE & REHABILITATION

## 2023-08-10 RX ORDER — FLUTICASONE FUROATE, UMECLIDINIUM BROMIDE AND VILANTEROL TRIFENATATE 200; 62.5; 25 UG/1; UG/1; UG/1
POWDER RESPIRATORY (INHALATION)
COMMUNITY
Start: 2023-07-17

## 2023-08-10 ASSESSMENT — PATIENT HEALTH QUESTIONNAIRE - PHQ9
SUM OF ALL RESPONSES TO PHQ QUESTIONS 1-9: 0
SUM OF ALL RESPONSES TO PHQ9 QUESTIONS 1 & 2: 0
1. LITTLE INTEREST OR PLEASURE IN DOING THINGS: 0
2. FEELING DOWN, DEPRESSED OR HOPELESS: 0
SUM OF ALL RESPONSES TO PHQ QUESTIONS 1-9: 0

## 2023-08-10 NOTE — PROGRESS NOTES
Dania Zaman presents today for   Chief Complaint   Patient presents with    Back Pain       Is someone accompanying this pt? no    Is the patient using any DME equipment during OV? no    Depression Screening:  No flowsheet data found. Learning Assessment:  No flowsheet data found. Abuse Screening:  No flowsheet data found. Fall Risk  No flowsheet data found. OPIOID RISK TOOL  No flowsheet data found. Coordination of Care:  1. Have you been to the ER, urgent care clinic since your last visit? no  Hospitalized since your last visit? no    2. Have you seen or consulted any other health care providers outside of the [address] [place] [address] since your last visit? no Include any pap smears or colon screening.  no

## 2023-08-10 NOTE — PROGRESS NOTES
[facility]  [address] [place] [address]   Phone: [phone]  Fax: [phone]      Patient: Urbano Lake                                                                              MRN: [id number]        YOB: 1955          AGE: 75 y.o. PCP: Mercedes Godinez MD  Date:  08/10/23    Reason for Consultation: Back Pain      HPI:  Urbano Lake is a 75 y.o. female with relevant PMH of HTN, prior CVA, abdominoplasty with slight left sided weakness, bilateral TKA  who presents with low back pain radiating down the left posterior thigh and into the groin. The pain began march 2020. She saw Dr. Eduarda Alfonso who noted  intact TKA and mild hip OA. She has seen GI, surgery and GYN. She has completed pelvic floor PT and recently completed PT for her hip. We got an MRI of her lumbar spine 7/5/2023- no official read, lumbar facet arthritis, grade 1 L4/5 anterolisthesis with bilateral foraminal narrowing. Neurologic symptoms: No numbness, tingling, weakness, bowel or bladder changes. No recent falls      Location: The pain is located in the left lower abdomen   Radiation: The pain does radiate down the left anterior posterior, groin. Pain Score: Currently: 1/10   Quality: Pain is of a dull, tight pulling quality. Aggravating: Pain is exacerbated by standing and bending  Alleviating:  The pain is alleviated by sitting    Prior Treatments:  Physical therapy: Yes- completed PT 5/30/23  Pelvic floor PT    Injections:No  Surgery:Yes  Previous Medications: mobic  Current Medications:1/2 hydrocodone, lidocaine patch, tylenol, valium 5mg prn  Previous work-up has included:   MRI lumbar spine 7/5/2023- official read pending  Lumbar facet arthritis  L4/5 bilateral foraminal narrowing     X-ray lumbar spine 5/17/21  Mild bilateral hip degenerative changes characterized by acetabular spurring, and mild bilateral greater trochanter

## 2023-09-28 ENCOUNTER — TELEPHONE (OUTPATIENT)
Age: 68
End: 2023-09-28

## 2023-09-29 ENCOUNTER — OFFICE VISIT (OUTPATIENT)
Age: 68
End: 2023-09-29
Payer: MEDICARE

## 2023-09-29 VITALS
OXYGEN SATURATION: 96 % | BODY MASS INDEX: 34.97 KG/M2 | SYSTOLIC BLOOD PRESSURE: 134 MMHG | RESPIRATION RATE: 14 BRPM | HEIGHT: 66 IN | TEMPERATURE: 97.5 F | DIASTOLIC BLOOD PRESSURE: 87 MMHG | WEIGHT: 217.6 LBS | HEART RATE: 71 BPM

## 2023-09-29 DIAGNOSIS — K44.9 PARAESOPHAGEAL HERNIA: Primary | ICD-10-CM

## 2023-09-29 PROCEDURE — 1123F ACP DISCUSS/DSCN MKR DOCD: CPT | Performed by: STUDENT IN AN ORGANIZED HEALTH CARE EDUCATION/TRAINING PROGRAM

## 2023-09-29 PROCEDURE — 99205 OFFICE O/P NEW HI 60 MIN: CPT | Performed by: STUDENT IN AN ORGANIZED HEALTH CARE EDUCATION/TRAINING PROGRAM

## 2023-09-29 RX ORDER — BUDESONIDE, GLYCOPYRROLATE, AND FORMOTEROL FUMARATE 160; 9; 4.8 UG/1; UG/1; UG/1
AEROSOL, METERED RESPIRATORY (INHALATION)
COMMUNITY
Start: 2023-09-08

## 2023-09-29 NOTE — PROGRESS NOTES
Patient states that she is here for a hiatal hernia. This was confirmed on 9/11/2023. Patient states that  this has been going on over 3 years. Patient states that she is on pain medication daily.

## 2023-10-23 ENCOUNTER — TELEPHONE (OUTPATIENT)
Age: 68
End: 2023-10-23

## 2023-10-23 NOTE — TELEPHONE ENCOUNTER
Patient called the nurse line requesting to speak to Dr. Gurwinder Ochoa , [facility]. Patient states she has questions about surgery prior to.

## 2023-11-03 ENCOUNTER — EMERGENCY VISIT (OUTPATIENT)
Dept: URBAN - METROPOLITAN AREA CLINIC 1 | Facility: CLINIC | Age: 68
End: 2023-11-03

## 2023-11-03 DIAGNOSIS — H00.14: ICD-10-CM

## 2023-11-03 PROCEDURE — 99213 OFFICE O/P EST LOW 20 MIN: CPT

## 2023-11-03 ASSESSMENT — TONOMETRY
OS_IOP_MMHG: 10
OD_IOP_MMHG: 10

## 2023-11-03 ASSESSMENT — KERATOMETRY
OD_AXISANGLE2_DEGREES: 6
OS_AXISANGLE_DEGREES: 086
OS_K1POWER_DIOPTERS: 37.00
OS_AXISANGLE2_DEGREES: 176
OD_K2POWER_DIOPTERS: 42.00
OS_K2POWER_DIOPTERS: 40.25
OD_K1POWER_DIOPTERS: 40.75
OD_AXISANGLE_DEGREES: 096

## 2023-11-03 ASSESSMENT — VISUAL ACUITY
OD_CC: 20/20
OS_CC: J1
OS_CC: 20/20-1
OD_CC: J1

## 2023-11-06 DIAGNOSIS — K44.9 PARAESOPHAGEAL HERNIA: ICD-10-CM

## 2023-11-06 DIAGNOSIS — Z01.818 PRE-OP TESTING: ICD-10-CM

## 2023-11-06 DIAGNOSIS — E66.9 CLASS 2 OBESITY WITH BODY MASS INDEX (BMI) OF 36.0 TO 36.9 IN ADULT, UNSPECIFIED OBESITY TYPE, UNSPECIFIED WHETHER SERIOUS COMORBIDITY PRESENT: Primary | ICD-10-CM

## 2023-11-14 ENCOUNTER — HOSPITAL ENCOUNTER (OUTPATIENT)
Facility: HOSPITAL | Age: 68
Discharge: HOME | End: 2023-11-17
Payer: MEDICARE

## 2023-11-14 LAB
EKG ATRIAL RATE: 61 BPM
EKG DIAGNOSIS: NORMAL
EKG P AXIS: 58 DEGREES
EKG P-R INTERVAL: 198 MS
EKG Q-T INTERVAL: 396 MS
EKG QRS DURATION: 90 MS
EKG QTC CALCULATION (BAZETT): 398 MS
EKG R AXIS: 42 DEGREES
EKG T AXIS: 41 DEGREES
EKG VENTRICULAR RATE: 61 BPM

## 2023-11-14 PROCEDURE — 93005 ELECTROCARDIOGRAM TRACING: CPT | Performed by: STUDENT IN AN ORGANIZED HEALTH CARE EDUCATION/TRAINING PROGRAM

## 2023-11-14 PROCEDURE — 93010 ELECTROCARDIOGRAM REPORT: CPT | Performed by: INTERNAL MEDICINE

## 2023-11-17 NOTE — PROGRESS NOTES
Instructions for your surgery at [address] [place] [address] Date:  11/17/2023      Patient's Name:  Riana Dickerson           Surgery Date:  11/29/2023              Please enter the main entrance of the hospital and check-in at the  [facility]. Once checked in at the , you will take the elevators to the second floor, and report to the waiting room on the left. The room will say Procedure Registration. Do NOT eat or drink anything, including candy, gum, or ice chips after midnight prior to your surgery, unless you have specific instructions from your surgeon or anesthesia provider to do so. Brush your teeth before coming to the hospital. You may swish with water, but do not swallow. No smoking/Vaping/E-Cigarettes 24 hours prior to the day of surgery. No alcohol 24 hours prior to the day of surgery. No recreational drugs for one week prior to the day of surgery. Bring Photo ID, Insurance information, and Co-pay if required on day of surgery. Bring in pertinent legal documents, such as, Medical Power of RAFFAELE KUMAR, DNR, Advance Directive, etc.  Leave all valuables, including money/purse, at home. Remove all jewelry, including ALL body piercings, nail polish, acrylic nails, and makeup (including mascara); no lotions, powders, deodorant, or perfume/cologne/after shave on the skin. Follow instruction for Hibiclens washes and CHG wipes from surgeon's office. Glasses and dentures may be worn to the hospital. They must be removed prior to surgery. Please bring case/container for glasses or dentures. Contact lenses should not be worn on day of surgery. Call your doctor's office if symptoms of a cold or illness develop within 24-48 hours prior to your surgery. Call your doctor's office if you have any questions concerning insurance or co-pays. 15. AN ADULT (relative or friend 25 years or older) [address]   16. Please make

## 2023-11-28 ENCOUNTER — PREP FOR PROCEDURE (OUTPATIENT)
Age: 68
End: 2023-11-28

## 2023-11-28 ENCOUNTER — ANESTHESIA EVENT (OUTPATIENT)
Facility: HOSPITAL | Age: 68
DRG: 328 | End: 2023-11-28
Payer: MEDICARE

## 2023-11-28 RX ORDER — SODIUM CHLORIDE 0.9 % (FLUSH) 0.9 %
5-40 SYRINGE (ML) INJECTION PRN
Status: CANCELLED | OUTPATIENT
Start: 2023-11-28

## 2023-11-28 RX ORDER — SODIUM CHLORIDE 9 MG/ML
INJECTION, SOLUTION INTRAVENOUS PRN
Status: CANCELLED | OUTPATIENT
Start: 2023-11-28

## 2023-11-28 RX ORDER — SODIUM CHLORIDE, SODIUM LACTATE, POTASSIUM CHLORIDE, CALCIUM CHLORIDE 600; 310; 30; 20 MG/100ML; MG/100ML; MG/100ML; MG/100ML
INJECTION, SOLUTION INTRAVENOUS CONTINUOUS
Status: CANCELLED | OUTPATIENT
Start: 2023-11-28

## 2023-11-28 RX ORDER — ACETAMINOPHEN 120 MG/1
650 SUPPOSITORY RECTAL ONCE
Status: CANCELLED | OUTPATIENT
Start: 2023-11-28 | End: 2023-11-28

## 2023-11-28 RX ORDER — SODIUM CHLORIDE 0.9 % (FLUSH) 0.9 %
5-40 SYRINGE (ML) INJECTION EVERY 12 HOURS SCHEDULED
Status: CANCELLED | OUTPATIENT
Start: 2023-11-28

## 2023-11-28 RX ORDER — PALONOSETRON 0.05 MG/ML
0.07 INJECTION, SOLUTION INTRAVENOUS ONCE
Status: CANCELLED | OUTPATIENT
Start: 2023-11-28 | End: 2023-11-28

## 2023-11-28 RX ORDER — ENOXAPARIN SODIUM 100 MG/ML
40 INJECTION SUBCUTANEOUS ONCE
Status: CANCELLED | OUTPATIENT
Start: 2023-11-28 | End: 2023-11-28

## 2023-11-28 RX ORDER — SCOLOPAMINE TRANSDERMAL SYSTEM 1 MG/1
1 PATCH, EXTENDED RELEASE TRANSDERMAL
Status: CANCELLED | OUTPATIENT
Start: 2023-11-28 | End: 2023-12-01

## 2023-11-29 ENCOUNTER — HOSPITAL ENCOUNTER (INPATIENT)
Facility: HOSPITAL | Age: 68
LOS: 1 days | Discharge: HOME | DRG: 328 | End: 2023-11-30
Attending: STUDENT IN AN ORGANIZED HEALTH CARE EDUCATION/TRAINING PROGRAM | Admitting: STUDENT IN AN ORGANIZED HEALTH CARE EDUCATION/TRAINING PROGRAM
Payer: MEDICARE

## 2023-11-29 ENCOUNTER — ANESTHESIA (OUTPATIENT)
Facility: HOSPITAL | Age: 68
DRG: 328 | End: 2023-11-29
Payer: MEDICARE

## 2023-11-29 DIAGNOSIS — G89.18 ACUTE POST-OPERATIVE PAIN: Primary | ICD-10-CM

## 2023-11-29 PROBLEM — K21.9 GASTROESOPHAGEAL REFLUX DISEASE CONCURRENT WITH AND DUE TO PARAESOPHAGEAL HERNIA: Status: ACTIVE | Noted: 2023-11-29

## 2023-11-29 PROBLEM — K44.9 GASTROESOPHAGEAL REFLUX DISEASE CONCURRENT WITH AND DUE TO PARAESOPHAGEAL HERNIA: Status: ACTIVE | Noted: 2023-11-29

## 2023-11-29 LAB
ABO + RH BLD: NORMAL
BLOOD GROUP ANTIBODIES SERPL: NORMAL
SPECIMEN EXP DATE BLD: NORMAL

## 2023-11-29 PROCEDURE — C9290 INJ, BUPIVACAINE LIPOSOME: HCPCS | Performed by: STUDENT IN AN ORGANIZED HEALTH CARE EDUCATION/TRAINING PROGRAM

## 2023-11-29 PROCEDURE — 2720000010 HC SURG SUPPLY STERILE: Performed by: STUDENT IN AN ORGANIZED HEALTH CARE EDUCATION/TRAINING PROGRAM

## 2023-11-29 PROCEDURE — 3700000000 HC ANESTHESIA ATTENDED CARE: Performed by: STUDENT IN AN ORGANIZED HEALTH CARE EDUCATION/TRAINING PROGRAM

## 2023-11-29 PROCEDURE — 86900 BLOOD TYPING SEROLOGIC ABO: CPT

## 2023-11-29 PROCEDURE — 2580000003 HC RX 258: Performed by: STUDENT IN AN ORGANIZED HEALTH CARE EDUCATION/TRAINING PROGRAM

## 2023-11-29 PROCEDURE — 7100000000 HC PACU RECOVERY - FIRST 15 MIN: Performed by: STUDENT IN AN ORGANIZED HEALTH CARE EDUCATION/TRAINING PROGRAM

## 2023-11-29 PROCEDURE — 3600000012 HC SURGERY LEVEL 2 ADDTL 15MIN: Performed by: STUDENT IN AN ORGANIZED HEALTH CARE EDUCATION/TRAINING PROGRAM

## 2023-11-29 PROCEDURE — 2500000003 HC RX 250 WO HCPCS: Performed by: NURSE ANESTHETIST, CERTIFIED REGISTERED

## 2023-11-29 PROCEDURE — C1713 ANCHOR/SCREW BN/BN,TIS/BN: HCPCS | Performed by: STUDENT IN AN ORGANIZED HEALTH CARE EDUCATION/TRAINING PROGRAM

## 2023-11-29 PROCEDURE — 2709999900 HC NON-CHARGEABLE SUPPLY: Performed by: STUDENT IN AN ORGANIZED HEALTH CARE EDUCATION/TRAINING PROGRAM

## 2023-11-29 PROCEDURE — 6370000000 HC RX 637 (ALT 250 FOR IP): Performed by: STUDENT IN AN ORGANIZED HEALTH CARE EDUCATION/TRAINING PROGRAM

## 2023-11-29 PROCEDURE — 86850 RBC ANTIBODY SCREEN: CPT

## 2023-11-29 PROCEDURE — 6360000002 HC RX W HCPCS: Performed by: NURSE ANESTHETIST, CERTIFIED REGISTERED

## 2023-11-29 PROCEDURE — 0DV44ZZ RESTRICTION OF ESOPHAGOGASTRIC JUNCTION, PERCUTANEOUS ENDOSCOPIC APPROACH: ICD-10-PCS | Performed by: STUDENT IN AN ORGANIZED HEALTH CARE EDUCATION/TRAINING PROGRAM

## 2023-11-29 PROCEDURE — 86901 BLOOD TYPING SEROLOGIC RH(D): CPT

## 2023-11-29 PROCEDURE — 7100000001 HC PACU RECOVERY - ADDTL 15 MIN: Performed by: STUDENT IN AN ORGANIZED HEALTH CARE EDUCATION/TRAINING PROGRAM

## 2023-11-29 PROCEDURE — 3600000002 HC SURGERY LEVEL 2 BASE: Performed by: STUDENT IN AN ORGANIZED HEALTH CARE EDUCATION/TRAINING PROGRAM

## 2023-11-29 PROCEDURE — 6360000002 HC RX W HCPCS: Performed by: STUDENT IN AN ORGANIZED HEALTH CARE EDUCATION/TRAINING PROGRAM

## 2023-11-29 PROCEDURE — 2580000003 HC RX 258: Performed by: NURSE ANESTHETIST, CERTIFIED REGISTERED

## 2023-11-29 PROCEDURE — A4217 STERILE WATER/SALINE, 500 ML: HCPCS | Performed by: STUDENT IN AN ORGANIZED HEALTH CARE EDUCATION/TRAINING PROGRAM

## 2023-11-29 PROCEDURE — 0BQT4ZZ REPAIR DIAPHRAGM, PERCUTANEOUS ENDOSCOPIC APPROACH: ICD-10-PCS | Performed by: STUDENT IN AN ORGANIZED HEALTH CARE EDUCATION/TRAINING PROGRAM

## 2023-11-29 PROCEDURE — 0BUT4JZ SUPPLEMENT DIAPHRAGM WITH SYNTHETIC SUBSTITUTE, PERCUTANEOUS ENDOSCOPIC APPROACH: ICD-10-PCS | Performed by: STUDENT IN AN ORGANIZED HEALTH CARE EDUCATION/TRAINING PROGRAM

## 2023-11-29 PROCEDURE — C1781 MESH (IMPLANTABLE): HCPCS | Performed by: STUDENT IN AN ORGANIZED HEALTH CARE EDUCATION/TRAINING PROGRAM

## 2023-11-29 PROCEDURE — 43282 LAP PARAESOPH HER RPR W/MESH: CPT | Performed by: STUDENT IN AN ORGANIZED HEALTH CARE EDUCATION/TRAINING PROGRAM

## 2023-11-29 PROCEDURE — 3700000001 HC ADD 15 MINUTES (ANESTHESIA): Performed by: STUDENT IN AN ORGANIZED HEALTH CARE EDUCATION/TRAINING PROGRAM

## 2023-11-29 PROCEDURE — A4216 STERILE WATER/SALINE, 10 ML: HCPCS | Performed by: NURSE ANESTHETIST, CERTIFIED REGISTERED

## 2023-11-29 PROCEDURE — 1100000000 HC RM PRIVATE

## 2023-11-29 DEVICE — MESH HERN W7XL10CM SYN TISS REINF BIOABSRB DISP BIO-A: Type: IMPLANTABLE DEVICE | Site: ABDOMEN | Status: FUNCTIONAL

## 2023-11-29 RX ORDER — SODIUM CHLORIDE, SODIUM LACTATE, POTASSIUM CHLORIDE, CALCIUM CHLORIDE 600; 310; 30; 20 MG/100ML; MG/100ML; MG/100ML; MG/100ML
INJECTION, SOLUTION INTRAVENOUS CONTINUOUS
Status: DISCONTINUED | OUTPATIENT
Start: 2023-11-29 | End: 2023-11-30 | Stop reason: HOSPADM

## 2023-11-29 RX ORDER — EPHEDRINE SULFATE/0.9% NACL/PF 50 MG/5 ML
SYRINGE (ML) INTRAVENOUS PRN
Status: DISCONTINUED | OUTPATIENT
Start: 2023-11-29 | End: 2023-11-29 | Stop reason: SDUPTHER

## 2023-11-29 RX ORDER — ONDANSETRON 2 MG/ML
4 INJECTION INTRAMUSCULAR; INTRAVENOUS
Status: DISCONTINUED | OUTPATIENT
Start: 2023-11-29 | End: 2023-11-29 | Stop reason: HOSPADM

## 2023-11-29 RX ORDER — SODIUM CHLORIDE 0.9 % (FLUSH) 0.9 %
5-40 SYRINGE (ML) INJECTION PRN
Status: DISCONTINUED | OUTPATIENT
Start: 2023-11-29 | End: 2023-11-30 | Stop reason: HOSPADM

## 2023-11-29 RX ORDER — PROPOFOL 10 MG/ML
INJECTION, EMULSION INTRAVENOUS PRN
Status: DISCONTINUED | OUTPATIENT
Start: 2023-11-29 | End: 2023-11-29 | Stop reason: SDUPTHER

## 2023-11-29 RX ORDER — LISINOPRIL 5 MG/1
5 TABLET ORAL DAILY
Status: DISCONTINUED | OUTPATIENT
Start: 2023-11-29 | End: 2023-11-30 | Stop reason: HOSPADM

## 2023-11-29 RX ORDER — IPRATROPIUM BROMIDE AND ALBUTEROL SULFATE 2.5; .5 MG/3ML; MG/3ML
1 SOLUTION RESPIRATORY (INHALATION)
Status: DISCONTINUED | OUTPATIENT
Start: 2023-11-29 | End: 2023-11-29 | Stop reason: HOSPADM

## 2023-11-29 RX ORDER — SODIUM CHLORIDE 0.9 % (FLUSH) 0.9 %
5-40 SYRINGE (ML) INJECTION PRN
Status: DISCONTINUED | OUTPATIENT
Start: 2023-11-29 | End: 2023-11-29 | Stop reason: HOSPADM

## 2023-11-29 RX ORDER — SODIUM CHLORIDE 0.9 % (FLUSH) 0.9 %
5-40 SYRINGE (ML) INJECTION EVERY 12 HOURS SCHEDULED
Status: DISCONTINUED | OUTPATIENT
Start: 2023-11-29 | End: 2023-11-30 | Stop reason: HOSPADM

## 2023-11-29 RX ORDER — SODIUM CHLORIDE 0.9 % (FLUSH) 0.9 %
5-40 SYRINGE (ML) INJECTION EVERY 12 HOURS SCHEDULED
Status: DISCONTINUED | OUTPATIENT
Start: 2023-11-29 | End: 2023-11-29 | Stop reason: HOSPADM

## 2023-11-29 RX ORDER — PALONOSETRON 0.05 MG/ML
0.07 INJECTION, SOLUTION INTRAVENOUS ONCE
Status: COMPLETED | OUTPATIENT
Start: 2023-11-29 | End: 2023-11-29

## 2023-11-29 RX ORDER — SODIUM CHLORIDE, SODIUM LACTATE, POTASSIUM CHLORIDE, CALCIUM CHLORIDE 600; 310; 30; 20 MG/100ML; MG/100ML; MG/100ML; MG/100ML
INJECTION, SOLUTION INTRAVENOUS CONTINUOUS
Status: DISCONTINUED | OUTPATIENT
Start: 2023-11-29 | End: 2023-11-29 | Stop reason: HOSPADM

## 2023-11-29 RX ORDER — ONDANSETRON 2 MG/ML
4 INJECTION INTRAMUSCULAR; INTRAVENOUS EVERY 6 HOURS
Status: DISCONTINUED | OUTPATIENT
Start: 2023-11-29 | End: 2023-11-30 | Stop reason: HOSPADM

## 2023-11-29 RX ORDER — PROCHLORPERAZINE EDISYLATE 5 MG/ML
5 INJECTION INTRAMUSCULAR; INTRAVENOUS
Status: DISCONTINUED | OUTPATIENT
Start: 2023-11-29 | End: 2023-11-29 | Stop reason: HOSPADM

## 2023-11-29 RX ORDER — FENTANYL CITRATE 50 UG/ML
INJECTION, SOLUTION INTRAMUSCULAR; INTRAVENOUS PRN
Status: DISCONTINUED | OUTPATIENT
Start: 2023-11-29 | End: 2023-11-29 | Stop reason: SDUPTHER

## 2023-11-29 RX ORDER — OXYCODONE HYDROCHLORIDE 5 MG/1
5 TABLET ORAL PRN
Status: DISCONTINUED | OUTPATIENT
Start: 2023-11-29 | End: 2023-11-29 | Stop reason: HOSPADM

## 2023-11-29 RX ORDER — LIDOCAINE HYDROCHLORIDE 10 MG/ML
1 INJECTION, SOLUTION EPIDURAL; INFILTRATION; INTRACAUDAL; PERINEURAL
Status: DISCONTINUED | OUTPATIENT
Start: 2023-11-29 | End: 2023-11-29 | Stop reason: HOSPADM

## 2023-11-29 RX ORDER — DEXMEDETOMIDINE HYDROCHLORIDE 100 UG/ML
INJECTION, SOLUTION INTRAVENOUS PRN
Status: DISCONTINUED | OUTPATIENT
Start: 2023-11-29 | End: 2023-11-29 | Stop reason: SDUPTHER

## 2023-11-29 RX ORDER — NIFEDIPINE 30 MG/1
30 TABLET, EXTENDED RELEASE ORAL DAILY
Status: DISCONTINUED | OUTPATIENT
Start: 2023-11-29 | End: 2023-11-30 | Stop reason: HOSPADM

## 2023-11-29 RX ORDER — ACETAMINOPHEN 160 MG/5ML
650 LIQUID ORAL EVERY 6 HOURS
Status: DISCONTINUED | OUTPATIENT
Start: 2023-11-29 | End: 2023-11-30 | Stop reason: HOSPADM

## 2023-11-29 RX ORDER — NEOSTIGMINE METHYLSULFATE 1 MG/ML
INJECTION, SOLUTION INTRAVENOUS PRN
Status: DISCONTINUED | OUTPATIENT
Start: 2023-11-29 | End: 2023-11-29 | Stop reason: SDUPTHER

## 2023-11-29 RX ORDER — DIAZEPAM 5 MG/1
5 TABLET ORAL EVERY 8 HOURS PRN
Status: DISCONTINUED | OUTPATIENT
Start: 2023-11-29 | End: 2023-11-30 | Stop reason: HOSPADM

## 2023-11-29 RX ORDER — HYDROMORPHONE HYDROCHLORIDE 2 MG/ML
INJECTION, SOLUTION INTRAMUSCULAR; INTRAVENOUS; SUBCUTANEOUS PRN
Status: DISCONTINUED | OUTPATIENT
Start: 2023-11-29 | End: 2023-11-29 | Stop reason: SDUPTHER

## 2023-11-29 RX ORDER — ENOXAPARIN SODIUM 100 MG/ML
40 INJECTION SUBCUTANEOUS ONCE
Status: COMPLETED | OUTPATIENT
Start: 2023-11-29 | End: 2023-11-29

## 2023-11-29 RX ORDER — OXYCODONE HYDROCHLORIDE 10 MG/1
10 TABLET ORAL PRN
Status: DISCONTINUED | OUTPATIENT
Start: 2023-11-29 | End: 2023-11-29 | Stop reason: HOSPADM

## 2023-11-29 RX ORDER — ACETAMINOPHEN 650 MG/1
650 SUPPOSITORY RECTAL ONCE
Status: DISCONTINUED | OUTPATIENT
Start: 2023-11-29 | End: 2023-11-29 | Stop reason: HOSPADM

## 2023-11-29 RX ORDER — ACETAMINOPHEN 500 MG
1000 TABLET ORAL
Status: DISCONTINUED | OUTPATIENT
Start: 2023-11-29 | End: 2023-11-29 | Stop reason: HOSPADM

## 2023-11-29 RX ORDER — GLYCOPYRROLATE 0.2 MG/ML
INJECTION INTRAMUSCULAR; INTRAVENOUS PRN
Status: DISCONTINUED | OUTPATIENT
Start: 2023-11-29 | End: 2023-11-29 | Stop reason: SDUPTHER

## 2023-11-29 RX ORDER — NIFEDIPINE 30 MG/1
30 TABLET, EXTENDED RELEASE ORAL DAILY
Status: DISCONTINUED | OUTPATIENT
Start: 2023-11-30 | End: 2023-11-29

## 2023-11-29 RX ORDER — METOCLOPRAMIDE HYDROCHLORIDE 5 MG/ML
10 INJECTION INTRAMUSCULAR; INTRAVENOUS EVERY 6 HOURS PRN
Status: DISCONTINUED | OUTPATIENT
Start: 2023-11-29 | End: 2023-11-30 | Stop reason: HOSPADM

## 2023-11-29 RX ORDER — SIMETHICONE 80 MG
80 TABLET,CHEWABLE ORAL EVERY 6 HOURS PRN
Status: DISCONTINUED | OUTPATIENT
Start: 2023-11-29 | End: 2023-11-30 | Stop reason: HOSPADM

## 2023-11-29 RX ORDER — LISINOPRIL 5 MG/1
5 TABLET ORAL DAILY
Status: DISCONTINUED | OUTPATIENT
Start: 2023-11-30 | End: 2023-11-29

## 2023-11-29 RX ORDER — SODIUM CHLORIDE 9 MG/ML
INJECTION, SOLUTION INTRAVENOUS PRN
Status: DISCONTINUED | OUTPATIENT
Start: 2023-11-29 | End: 2023-11-29 | Stop reason: HOSPADM

## 2023-11-29 RX ORDER — ONDANSETRON 2 MG/ML
INJECTION INTRAMUSCULAR; INTRAVENOUS PRN
Status: DISCONTINUED | OUTPATIENT
Start: 2023-11-29 | End: 2023-11-29 | Stop reason: SDUPTHER

## 2023-11-29 RX ORDER — MIDAZOLAM HYDROCHLORIDE 1 MG/ML
INJECTION INTRAMUSCULAR; INTRAVENOUS PRN
Status: DISCONTINUED | OUTPATIENT
Start: 2023-11-29 | End: 2023-11-29 | Stop reason: SDUPTHER

## 2023-11-29 RX ORDER — LORAZEPAM 2 MG/ML
0.5 INJECTION INTRAMUSCULAR
Status: DISCONTINUED | OUTPATIENT
Start: 2023-11-29 | End: 2023-11-29 | Stop reason: HOSPADM

## 2023-11-29 RX ORDER — LIDOCAINE HYDROCHLORIDE 20 MG/ML
INJECTION, SOLUTION EPIDURAL; INFILTRATION; INTRACAUDAL; PERINEURAL PRN
Status: DISCONTINUED | OUTPATIENT
Start: 2023-11-29 | End: 2023-11-29 | Stop reason: SDUPTHER

## 2023-11-29 RX ORDER — SCOLOPAMINE TRANSDERMAL SYSTEM 1 MG/1
1 PATCH, EXTENDED RELEASE TRANSDERMAL
Status: DISCONTINUED | OUTPATIENT
Start: 2023-12-02 | End: 2023-11-30 | Stop reason: HOSPADM

## 2023-11-29 RX ORDER — SUCCINYLCHOLINE/SOD CL,ISO/PF 100 MG/5ML
SYRINGE (ML) INTRAVENOUS PRN
Status: DISCONTINUED | OUTPATIENT
Start: 2023-11-29 | End: 2023-11-29 | Stop reason: SDUPTHER

## 2023-11-29 RX ORDER — SCOLOPAMINE TRANSDERMAL SYSTEM 1 MG/1
1 PATCH, EXTENDED RELEASE TRANSDERMAL
Status: DISCONTINUED | OUTPATIENT
Start: 2023-11-29 | End: 2023-11-29

## 2023-11-29 RX ORDER — ACETAMINOPHEN 120 MG/1
SUPPOSITORY RECTAL PRN
Status: DISCONTINUED | OUTPATIENT
Start: 2023-11-29 | End: 2023-11-29 | Stop reason: ALTCHOICE

## 2023-11-29 RX ORDER — HYDRALAZINE HYDROCHLORIDE 20 MG/ML
10 INJECTION INTRAMUSCULAR; INTRAVENOUS EVERY 6 HOURS PRN
Status: DISCONTINUED | OUTPATIENT
Start: 2023-11-29 | End: 2023-11-30 | Stop reason: HOSPADM

## 2023-11-29 RX ORDER — FENTANYL CITRATE 50 UG/ML
25 INJECTION, SOLUTION INTRAMUSCULAR; INTRAVENOUS EVERY 5 MIN PRN
Status: DISCONTINUED | OUTPATIENT
Start: 2023-11-29 | End: 2023-11-29 | Stop reason: HOSPADM

## 2023-11-29 RX ORDER — DEXAMETHASONE SODIUM PHOSPHATE 4 MG/ML
INJECTION, SOLUTION INTRA-ARTICULAR; INTRALESIONAL; INTRAMUSCULAR; INTRAVENOUS; SOFT TISSUE PRN
Status: DISCONTINUED | OUTPATIENT
Start: 2023-11-29 | End: 2023-11-29 | Stop reason: SDUPTHER

## 2023-11-29 RX ORDER — SODIUM CHLORIDE 9 MG/ML
INJECTION, SOLUTION INTRAVENOUS PRN
Status: DISCONTINUED | OUTPATIENT
Start: 2023-11-29 | End: 2023-11-30 | Stop reason: HOSPADM

## 2023-11-29 RX ORDER — ENOXAPARIN SODIUM 100 MG/ML
40 INJECTION SUBCUTANEOUS EVERY 12 HOURS SCHEDULED
Status: DISCONTINUED | OUTPATIENT
Start: 2023-11-30 | End: 2023-11-30 | Stop reason: HOSPADM

## 2023-11-29 RX ORDER — ROCURONIUM BROMIDE 10 MG/ML
INJECTION, SOLUTION INTRAVENOUS PRN
Status: DISCONTINUED | OUTPATIENT
Start: 2023-11-29 | End: 2023-11-29 | Stop reason: SDUPTHER

## 2023-11-29 RX ORDER — OXYCODONE HYDROCHLORIDE 5 MG/1
5 TABLET ORAL EVERY 4 HOURS PRN
Status: DISCONTINUED | OUTPATIENT
Start: 2023-11-29 | End: 2023-11-30 | Stop reason: HOSPADM

## 2023-11-29 RX ADMIN — ONDANSETRON 4 MG: 2 INJECTION INTRAMUSCULAR; INTRAVENOUS at 16:14

## 2023-11-29 RX ADMIN — SODIUM CHLORIDE, SODIUM LACTATE, POTASSIUM CHLORIDE, AND CALCIUM CHLORIDE: 600; 310; 30; 20 INJECTION, SOLUTION INTRAVENOUS at 22:16

## 2023-11-29 RX ADMIN — ENOXAPARIN SODIUM 40 MG: 100 INJECTION SUBCUTANEOUS at 06:50

## 2023-11-29 RX ADMIN — SODIUM CHLORIDE, SODIUM LACTATE, POTASSIUM CHLORIDE, AND CALCIUM CHLORIDE: 600; 310; 30; 20 INJECTION, SOLUTION INTRAVENOUS at 13:30

## 2023-11-29 RX ADMIN — FENTANYL CITRATE 100 MCG: 50 INJECTION INTRAMUSCULAR; INTRAVENOUS at 07:36

## 2023-11-29 RX ADMIN — GLYCOPYRROLATE 0.4 MG: 0.2 INJECTION INTRAMUSCULAR; INTRAVENOUS at 11:23

## 2023-11-29 RX ADMIN — OXYCODONE HYDROCHLORIDE 5 MG: 5 TABLET ORAL at 13:55

## 2023-11-29 RX ADMIN — ROCURONIUM BROMIDE 20 MG: 10 INJECTION, SOLUTION INTRAVENOUS at 08:48

## 2023-11-29 RX ADMIN — LISINOPRIL 5 MG: 5 TABLET ORAL at 18:08

## 2023-11-29 RX ADMIN — WATER 2000 MG: 1 INJECTION, SOLUTION INTRAMUSCULAR; INTRAVENOUS; SUBCUTANEOUS at 07:43

## 2023-11-29 RX ADMIN — ONDANSETRON 4 MG: 2 INJECTION INTRAMUSCULAR; INTRAVENOUS at 22:39

## 2023-11-29 RX ADMIN — NIFEDIPINE 30 MG: 30 TABLET, FILM COATED, EXTENDED RELEASE ORAL at 21:57

## 2023-11-29 RX ADMIN — MIDAZOLAM 2 MG: 1 INJECTION, SOLUTION INTRAMUSCULAR; INTRAVENOUS at 07:29

## 2023-11-29 RX ADMIN — FAMOTIDINE 20 MG: 10 INJECTION, SOLUTION INTRAVENOUS at 06:50

## 2023-11-29 RX ADMIN — DEXMEDETOMIDINE HYDROCHLORIDE 6 MCG: 100 INJECTION, SOLUTION INTRAVENOUS at 07:45

## 2023-11-29 RX ADMIN — HYDRALAZINE HYDROCHLORIDE 10 MG: 20 INJECTION, SOLUTION INTRAMUSCULAR; INTRAVENOUS at 20:49

## 2023-11-29 RX ADMIN — SODIUM CHLORIDE, PRESERVATIVE FREE 10 ML: 5 INJECTION INTRAVENOUS at 22:00

## 2023-11-29 RX ADMIN — HYDROMORPHONE HYDROCHLORIDE 0.4 MG: 2 INJECTION INTRAMUSCULAR; INTRAVENOUS; SUBCUTANEOUS at 08:48

## 2023-11-29 RX ADMIN — SODIUM CHLORIDE, SODIUM LACTATE, POTASSIUM CHLORIDE, AND CALCIUM CHLORIDE: 600; 310; 30; 20 INJECTION, SOLUTION INTRAVENOUS at 09:23

## 2023-11-29 RX ADMIN — DIAZEPAM 5 MG: 5 TABLET ORAL at 22:18

## 2023-11-29 RX ADMIN — ACETAMINOPHEN 650 MG: 650 SOLUTION ORAL at 20:52

## 2023-11-29 RX ADMIN — LIDOCAINE HYDROCHLORIDE 100 MG: 20 INJECTION, SOLUTION EPIDURAL; INFILTRATION; INTRACAUDAL; PERINEURAL at 07:36

## 2023-11-29 RX ADMIN — DEXAMETHASONE SODIUM PHOSPHATE 4 MG: 4 INJECTION, SOLUTION INTRAMUSCULAR; INTRAVENOUS at 07:41

## 2023-11-29 RX ADMIN — NEOSTIGMINE METHYLSULFATE 4 MG: 1 INJECTION, SOLUTION INTRAVENOUS at 11:23

## 2023-11-29 RX ADMIN — SODIUM CHLORIDE, SODIUM LACTATE, POTASSIUM CHLORIDE, AND CALCIUM CHLORIDE: 600; 310; 30; 20 INJECTION, SOLUTION INTRAVENOUS at 06:49

## 2023-11-29 RX ADMIN — ONDANSETRON 4 MG: 2 INJECTION INTRAMUSCULAR; INTRAVENOUS at 11:22

## 2023-11-29 RX ADMIN — Medication 100 MG: at 07:36

## 2023-11-29 RX ADMIN — Medication 10 MG: at 08:25

## 2023-11-29 RX ADMIN — ROCURONIUM BROMIDE 50 MG: 10 INJECTION, SOLUTION INTRAVENOUS at 07:46

## 2023-11-29 RX ADMIN — PROPOFOL 150 MG: 10 INJECTION, EMULSION INTRAVENOUS at 07:36

## 2023-11-29 RX ADMIN — Medication 10 MG: at 08:22

## 2023-11-29 RX ADMIN — PALONOSETRON 0.07 MG: 0.05 INJECTION, SOLUTION INTRAVENOUS at 06:50

## 2023-11-29 RX ADMIN — ROCURONIUM BROMIDE 30 MG: 10 INJECTION, SOLUTION INTRAVENOUS at 09:30

## 2023-11-29 RX ADMIN — HYDROMORPHONE HYDROCHLORIDE 0.4 MG: 2 INJECTION INTRAMUSCULAR; INTRAVENOUS; SUBCUTANEOUS at 10:02

## 2023-11-29 ASSESSMENT — PAIN SCALES - GENERAL
PAINLEVEL_OUTOF10: 1
PAINLEVEL_OUTOF10: 3
PAINLEVEL_OUTOF10: 1

## 2023-11-29 ASSESSMENT — PAIN - FUNCTIONAL ASSESSMENT: PAIN_FUNCTIONAL_ASSESSMENT: 0-10

## 2023-11-29 ASSESSMENT — PAIN DESCRIPTION - LOCATION
LOCATION: ABDOMEN
LOCATION: ABDOMEN

## 2023-11-29 ASSESSMENT — PAIN DESCRIPTION - DESCRIPTORS: DESCRIPTORS: ACHING;DISCOMFORT

## 2023-11-29 ASSESSMENT — PAIN DESCRIPTION - ORIENTATION: ORIENTATION: LEFT

## 2023-11-29 NOTE — ANESTHESIA PRE PROCEDURE
03:13 PM    CO2 28 09/13/2022 03:13 PM    BUN 15 09/13/2022 03:13 PM    CREATININE 1.0 09/13/2022 03:13 PM    GFRAA >60 12/11/2019 12:55 PM    LABGLOM >60 12/11/2019 12:55 PM    GLUCOSE 87 09/13/2022 03:13 PM    PROT 7.0 11/11/2019 11:59 AM    CALCIUM 9.4 09/13/2022 03:13 PM       POC Tests: No results for input(s): \"POCGLU\", \"POCNA\", \"POCK\", \"POCCL\", \"POCBUN\", \"POCHEMO\", \"POCHCT\" in the last 72 hours. Coags: No results found for: \"PROTIME\", \"INR\", \"APTT\"    HCG (If Applicable): No results found for: \"PREGTESTUR\", \"PREGSERUM\", \"HCG\", \"HCGQUANT\"     ABGs: No results found for: \"PHART\", \"PO2ART\", \"MBA1HUO\", \"IDY2OBU\", \"BEART\", \"M8SIBPWH\"     Type & Screen (If Applicable):  No results found for: \"LABABO\", \"LABRH\"    Drug/Infectious Status (If Applicable):  No results found for: \"HIV\", \"HEPCAB\"    COVID-19 Screening (If Applicable): No results found for: \"COVID19\"        Anesthesia Evaluation  Patient summary reviewed  Airway: Mallampati: II  TM distance: >3 FB   Neck ROM: full  Mouth opening: > = 3 FB   Dental: normal exam         Pulmonary:normal exam    (+) asthma: seasonal asthma,                            Cardiovascular:  Exercise tolerance: good (>4 METS),   (+) hypertension: no interval change,                   Neuro/Psych:   (+) CVA (~ 14 years ago): no interval change,             GI/Hepatic/Renal:   (+) hiatal hernia, GERD: poorly controlled, morbid obesity          Endo/Other:                     Abdominal:             Vascular: Other Findings:           Anesthesia Plan      general     ASA 3       Induction: intravenous. Anesthetic plan and risks discussed with patient. Plan discussed with CRNA.                     Salvador Asif MD   11/29/2023

## 2023-11-29 NOTE — OP NOTE
OPERATIVE REPORT     Patient:Mine Fernandes   : 1955  Medical Record CYHEYH:[id number]    Pre-operative Diagnosis:  Paraesophageal hernia [K44.9]                  Post-operative Diagnosis: Same                 Procedure:   Laparoscopic Paraesophageal Hernia Repair with mesh (modifier 22 applied owing to massively sized paraesophageal hernia with significant intraperitoneal fat herniating up into the chest as well as limited visualization and working space from prior abdominoplasty)  Lysis of adhesions, approximately 15 minutes  Harman Fundoplication  Bilateral TAP blocks    [facility]                  Surgeon: Trevor Bailey MD                  Assistant:  Edgar Peoples NP  (No qualified resident was available for assistance; assistant was involved in port placement, camera operation, manipulation and retraction of tissue, removing the excised specimen, and skin closure)    Anesthesia: General     Specimen:  None    EBL: 25 cc    Complications: none    Findings:  Midline adhesions from prior abdominoplasty/hernia repair taken down with ultrasonic dissection after abdominal entry  Massive type III paraesophageal hernia with some organoaxial volvulus of the stomach  Significantly redundant hernia sac as well as large amount of intraperitoneal fat herniating up in the chest contributed to prolonged dissection. Hernia sac and associated fat was excised and discarded after mobilization  Posterior cruroplasty performed with 2-0 Ethibond suture with pledgets, and further reinforced with a running 3-0 nonabsorbable V-Loc suture. Cruroplasty reinforced with 7 x 10 cm Bio-A mesh cut to fit  Harman fundoplication performed in the usual fashion      STATEMENT OF MEDICAL NECESSITY: The patient is a 70y.o.-year-old  female who has had a long-standing history of very large paraesophageal hernia.   She has been followed by pulmonology for some time who feels that a significant portion of her

## 2023-11-29 NOTE — H&P
INDICATION/HISTORY: J98.11: Atelectasis     COMPARISON: CT of the abdomen 12/11/2022     TECHNIQUE: CT chest without IV contrast.   All CT scans at this facility are performed using dose optimization technique as appropriate to the performed examination, to include automated exposure control, adjustment of the mA and/or kV according to patient's size (including appropriate matching for site-specific examinations), or use of an iterative reconstruction technique. FINDINGS:   Base of neck: The thyroid gland is unremarkable. There is no adenopathy at the thoracic inlet. Lung/Airway:  Central airways are patent. Lung windows demonstrate no infiltrates, masses or pulmonary nodules. Mild groundglass opacity seen in the left lower lung likely atelectasis secondary to large adjacent hiatal hernia. Pleura:  No pleural effusion. No pneumothorax. Lymph nodes: No pathological adenopathy in the mediastinum or hilar regions. No axillary or internal mammary adenopathy. Cardiovascular: The heart size is normal. There is mild atherosclerotic disease in the thoracic aorta. Pulmonary arteries are unremarkable. Chest wall: Negative. Upper abdominal structures: There is a large retrocardiac hiatal hernia containing a large portion of the stomach and omentum. There is a 2.5 cm round hypodensity in the posterior right kidney which likely represents a cyst.     Bones: No unusual osseous pathology. Degenerative changes are seen in the thoracic spine. There is a mild kyphosis. IMPRESSION:     Large retrocardiac hiatal hernia containing the majority of the stomach. Mild atelectasis of the left lower lobe secondary to large hiatal hernia. Assessment: This patient is a 75 y.o. female with very large paraesophageal hernia with some mass effect on the left lung, and refractory shortness of breath and cough thought to be secondary to paraesophageal hernia.         Plan:      I had an extensive

## 2023-11-29 NOTE — PROGRESS NOTES
Pt bp 1400 141/86 and 1600 145/86. Perfectserved Dr. Dorita Laird, pt can be given missed dosage as long as anesthesia did not give pt bp medications. Nurse checked with PACU and no bp medications given in PACU or anesthesa. Bp medications updated to be given tonight.

## 2023-11-29 NOTE — PERIOP NOTE
Patient Shahrzad Syed has been informed that [facility] is not responsible for patient belongings per policy and the signed [facility] Patient Agreement document. Personal items should be sent home or checked in with security. Patient Shahrzad Syed selected the following action:                            [x]  Send personal items home with a family member or friend                                                 []  Check in personal items with security, excluding clothing                            []  Maintain personal items at the bedside, against recommendation                                 by [facility]                                   ** If patient Richelle Mena chooses to maintain personal items at the bedside,                                      Complete the patient belongings inventory in the EMR.

## 2023-11-29 NOTE — ANESTHESIA POSTPROCEDURE EVALUATION
Department of Anesthesiology  Postprocedure Note    Patient: Porsche Hernandez  MRN: [id number]  YOB: 1955  Date of evaluation: 11/29/2023      Procedure Summary     Date: 11/29/23 Room / Location: [facility] MAIN 03 / [facility] MAIN OR    Anesthesia Start: 8114 Anesthesia Stop: 1    Procedure: LAPAROSCOPIC PARAESOPHAGEAL HERNIA REPAIRWITH MESH  WITH PARTIAL FUNDOPLICATION (Abdomen) Diagnosis:       Paraesophageal hernia      (Paraesophageal hernia [K44.9])    Surgeons: Anat Ureña MD Responsible Provider: Annetta Matute MD    Anesthesia Type: General ASA Status: 3          Anesthesia Type: General    Albert Phase I: Albert Score: 8    Albert Phase II:        Anesthesia Post Evaluation    Patient location during evaluation: PACU  Patient participation: complete - patient participated  Level of consciousness: awake and alert  Pain score: 2  Airway patency: patent  Nausea & Vomiting: no nausea and no vomiting  Complications: no  Cardiovascular status: hemodynamically stable  Respiratory status: acceptable  Hydration status: euvolemic  Multimodal analgesia pain management approach  Pain management: adequate

## 2023-11-30 VITALS
WEIGHT: 216 LBS | BODY MASS INDEX: 34.72 KG/M2 | RESPIRATION RATE: 18 BRPM | DIASTOLIC BLOOD PRESSURE: 85 MMHG | OXYGEN SATURATION: 93 % | HEART RATE: 83 BPM | TEMPERATURE: 98.7 F | SYSTOLIC BLOOD PRESSURE: 130 MMHG | HEIGHT: 66 IN

## 2023-11-30 LAB
ALBUMIN SERPL-MCNC: 3.4 G/DL (ref 3.4–5)
ALBUMIN/GLOB SERPL: 1 (ref 0.8–1.7)
ALP SERPL-CCNC: 60 U/L (ref 45–117)
ALT SERPL-CCNC: 243 U/L (ref 13–56)
ANION GAP SERPL CALC-SCNC: 4 MMOL/L (ref 3–18)
AST SERPL-CCNC: 368 U/L (ref 10–38)
BASOPHILS # BLD: 0 K/UL (ref 0–0.1)
BASOPHILS NFR BLD: 0 % (ref 0–2)
BILIRUB SERPL-MCNC: 1.3 MG/DL (ref 0.2–1)
BUN SERPL-MCNC: 12 MG/DL (ref 7–18)
BUN/CREAT SERPL: 14 (ref 12–20)
CALCIUM SERPL-MCNC: 8.9 MG/DL (ref 8.5–10.1)
CHLORIDE SERPL-SCNC: 107 MMOL/L (ref 100–111)
CO2 SERPL-SCNC: 28 MMOL/L (ref 21–32)
CREAT SERPL-MCNC: 0.88 MG/DL (ref 0.6–1.3)
DIFFERENTIAL METHOD BLD: ABNORMAL
EOSINOPHIL # BLD: 0 K/UL (ref 0–0.4)
EOSINOPHIL NFR BLD: 0 % (ref 0–5)
ERYTHROCYTE [DISTWIDTH] IN BLOOD BY AUTOMATED COUNT: 14.6 % (ref 11.6–14.5)
GLOBULIN SER CALC-MCNC: 3.5 G/DL (ref 2–4)
GLUCOSE SERPL-MCNC: 102 MG/DL (ref 74–99)
HCT VFR BLD AUTO: 38.9 % (ref 35–45)
HGB BLD-MCNC: 12.1 G/DL (ref 12–16)
IMM GRANULOCYTES # BLD AUTO: 0 K/UL (ref 0–0.04)
IMM GRANULOCYTES NFR BLD AUTO: 0 % (ref 0–0.5)
LYMPHOCYTES # BLD: 1.5 K/UL (ref 0.9–3.6)
LYMPHOCYTES NFR BLD: 15 % (ref 21–52)
MAGNESIUM SERPL-MCNC: 1.5 MG/DL (ref 1.6–2.6)
MCH RBC QN AUTO: 26.1 PG (ref 24–34)
MCHC RBC AUTO-ENTMCNC: 31.1 G/DL (ref 31–37)
MCV RBC AUTO: 84 FL (ref 78–100)
MONOCYTES # BLD: 0.8 K/UL (ref 0.05–1.2)
MONOCYTES NFR BLD: 9 % (ref 3–10)
NEUTS SEG # BLD: 7.5 K/UL (ref 1.8–8)
NEUTS SEG NFR BLD: 76 % (ref 40–73)
NRBC # BLD: 0 K/UL (ref 0–0.01)
NRBC BLD-RTO: 0 PER 100 WBC
PLATELET # BLD AUTO: 237 K/UL (ref 135–420)
PMV BLD AUTO: 8.9 FL (ref 9.2–11.8)
POTASSIUM SERPL-SCNC: 3.8 MMOL/L (ref 3.5–5.5)
PROT SERPL-MCNC: 6.9 G/DL (ref 6.4–8.2)
RBC # BLD AUTO: 4.63 M/UL (ref 4.2–5.3)
SODIUM SERPL-SCNC: 139 MMOL/L (ref 136–145)
WBC # BLD AUTO: 9.8 K/UL (ref 4.6–13.2)

## 2023-11-30 PROCEDURE — 85025 COMPLETE CBC W/AUTO DIFF WBC: CPT

## 2023-11-30 PROCEDURE — 6360000002 HC RX W HCPCS: Performed by: STUDENT IN AN ORGANIZED HEALTH CARE EDUCATION/TRAINING PROGRAM

## 2023-11-30 PROCEDURE — 83735 ASSAY OF MAGNESIUM: CPT

## 2023-11-30 PROCEDURE — 80053 COMPREHEN METABOLIC PANEL: CPT

## 2023-11-30 PROCEDURE — 2580000003 HC RX 258: Performed by: STUDENT IN AN ORGANIZED HEALTH CARE EDUCATION/TRAINING PROGRAM

## 2023-11-30 PROCEDURE — 36415 COLL VENOUS BLD VENIPUNCTURE: CPT

## 2023-11-30 PROCEDURE — 6370000000 HC RX 637 (ALT 250 FOR IP): Performed by: STUDENT IN AN ORGANIZED HEALTH CARE EDUCATION/TRAINING PROGRAM

## 2023-11-30 RX ORDER — ONDANSETRON 4 MG/1
4 TABLET, ORALLY DISINTEGRATING ORAL EVERY 8 HOURS PRN
Qty: 12 TABLET | Refills: 0 | Status: SHIPPED | OUTPATIENT
Start: 2023-11-30

## 2023-11-30 RX ORDER — SIMETHICONE 80 MG
80 TABLET,CHEWABLE ORAL EVERY 6 HOURS PRN
Qty: 12 TABLET | Refills: 0 | Status: SHIPPED | OUTPATIENT
Start: 2023-11-30

## 2023-11-30 RX ORDER — OXYCODONE HYDROCHLORIDE 5 MG/1
5 TABLET ORAL EVERY 6 HOURS PRN
Qty: 10 TABLET | Refills: 0 | Status: SHIPPED | OUTPATIENT
Start: 2023-11-30 | End: 2023-12-03

## 2023-11-30 RX ADMIN — ACETAMINOPHEN 650 MG: 650 SOLUTION ORAL at 08:30

## 2023-11-30 RX ADMIN — ONDANSETRON 4 MG: 2 INJECTION INTRAMUSCULAR; INTRAVENOUS at 04:26

## 2023-11-30 RX ADMIN — OXYCODONE HYDROCHLORIDE 5 MG: 5 TABLET ORAL at 02:15

## 2023-11-30 RX ADMIN — ONDANSETRON 4 MG: 2 INJECTION INTRAMUSCULAR; INTRAVENOUS at 11:01

## 2023-11-30 RX ADMIN — ENOXAPARIN SODIUM 40 MG: 100 INJECTION SUBCUTANEOUS at 09:17

## 2023-11-30 RX ADMIN — SODIUM CHLORIDE, PRESERVATIVE FREE 10 ML: 5 INJECTION INTRAVENOUS at 11:02

## 2023-11-30 RX ADMIN — NIFEDIPINE 30 MG: 30 TABLET, FILM COATED, EXTENDED RELEASE ORAL at 09:16

## 2023-11-30 RX ADMIN — LISINOPRIL 5 MG: 5 TABLET ORAL at 09:16

## 2023-11-30 RX ADMIN — SODIUM CHLORIDE, SODIUM LACTATE, POTASSIUM CHLORIDE, AND CALCIUM CHLORIDE: 600; 310; 30; 20 INJECTION, SOLUTION INTRAVENOUS at 04:29

## 2023-11-30 ASSESSMENT — PAIN DESCRIPTION - LOCATION: LOCATION: ABDOMEN

## 2023-11-30 ASSESSMENT — PAIN SCALES - GENERAL
PAINLEVEL_OUTOF10: 2
PAINLEVEL_OUTOF10: 0
PAINLEVEL_OUTOF10: 4
PAINLEVEL_OUTOF10: 2

## 2023-11-30 ASSESSMENT — PAIN DESCRIPTION - DESCRIPTORS: DESCRIPTORS: ACHING

## 2023-11-30 ASSESSMENT — PAIN DESCRIPTION - ORIENTATION: ORIENTATION: LEFT

## 2023-11-30 NOTE — PROGRESS NOTES
Patient in bed AAOx4 crying and very anxious because she is worries having stroke,because blood pressure was elevated=164/78. Patient explained and assure that her blood pressure came sophia after hydralazine iv ,rechecked DX=025//81. Dr Dm Lim was informed new order received.   2218 valium 5 mg po given

## 2023-11-30 NOTE — DISCHARGE SUMMARY
Surgery Discharge Progress Note    Admission Date: 11/29/2023    Discharge Date: 11/30/2023    Pre-operative Diagnosis:  Paraesophageal hernia [K44.9]                  Post-operative Diagnosis: Same                 Procedure:   Laparoscopic Paraesophageal Hernia Repair with mesh (modifier 22 applied owing to massively sized paraesophageal hernia with significant intraperitoneal fat herniating up into the chest as well as limited visualization and working space from prior abdominoplasty)  Lysis of adhesions, approximately 15 minutes  Harman Fundoplication  Bilateral TAP blocks    Postop Complications: none    Hospital Course:  Patient was admitted on 11/29/2023 for scheduled surgery. Operation was without significant complication. Patient admitted to the floor postoperatively, monitored as per protocol. Diet sequentially advanced beginning POD 1, pain medications transitioned to oral during the hospital course. Currently the patient is afebrile, vital signs stable, tolerating a clear liquid diet with protein supplementation, voiding spontaneously, ambulatory with adequate pain control with oral medications and clear surgical sites without evidence of infection. Discharge Diet:  Liquid diet for 2 weeks, then soft diet for 2 weeks. Discharge Medications:     Medication List        START taking these medications      ondansetron 4 MG disintegrating tablet  Commonly known as: ZOFRAN-ODT  Take 1 tablet by mouth every 8 hours as needed for Nausea or Vomiting     oxyCODONE 5 MG immediate release tablet  Commonly known as: Roxicodone  Take 1 tablet by mouth every 6 hours as needed for Pain for up to 3 days. Do not take hydrocodone-acetaminophen (Norco) while taking this medication.  Max Daily Amount: 20 mg     simethicone 80 MG chewable tablet  Commonly known as: MYLICON  Take 1 tablet by mouth every 6 hours as needed for Flatulence            CONTINUE taking these medications      acetaminophen 500 MG tablet  Commonly

## 2023-11-30 NOTE — PLAN OF CARE
Problem: Chronic Conditions and Co-morbidities  Goal: Patient's chronic conditions and co-morbidity symptoms are monitored and maintained or improved  11/29/2023 2246 by Vasile Lao RN  Outcome: Progressing  11/29/2023 1556 by Robert Wing RN  Outcome: Progressing     Problem: Pain  Goal: Verbalizes/displays adequate comfort level or baseline comfort level  11/29/2023 2246 by Vasile Lao RN  Outcome: Progressing  11/29/2023 1556 by Robert Wing RN  Outcome: Progressing     Problem: Discharge Planning  Goal: Discharge to home or other facility with appropriate resources  11/29/2023 2246 by Vasile Lao RN  Outcome: Progressing  11/29/2023 1556 by Robert Wing RN  Outcome: Progressing     Problem: Safety - Adult  Goal: Free from fall injury  Outcome: Progressing

## 2023-11-30 NOTE — CONSULTS
Nutrition Note    Consult noted for diet education on fundoplication diet. Pt s/p Laparoscopic Paraesophageal Hernia Repair with mesh 11/29/23. Pt seen at bedside with daughter present. Writer reviewed Fundoplication Diet handout with pt. Discussed and provided examples of liquids pt is to consume. Pt states she does not consume milk products. Writer provided examples of lactose free and dairy free protein supplements. Informed once cleared by MD in ~2 weeks transition to soft foods. Soft diet should be followed ~2-6 weeks gradually adding regular foods back into diet. Advised pt to consume small frequent meals, foods that are easy to digest, chew food well. Avoid straws, chewing gum, caffeine, carbonated drinks, foods that cause gas, nuts and seeds, tough meats, fried and spicy foods. Writer provided examples of foods recommended and not recommended while following soft diet. All questions answered. Left handout for pt review. Will Follow-up per policy. Nutrition Education:  Educated on Fundoplication Diet  Learners: Patient and Family  Readiness: Acceptance  Method: Explanation and Handout  Response: Verbalizes Understanding  Contact name and number provided. Nutrition Recommendations/Plan:   Continue Current Diet. Will follow-up per policy.         Electronically signed by Jose A Forrest RD on 11/30/23 at 8:08 AM EST    Contact: [phone]

## 2023-11-30 NOTE — ACP (ADVANCE CARE PLANNING)
Advance Care Planning     Advance Care Planning Inpatient Note  hospitals Care Department    Today's Date: 11/30/2023  [facility] SURGICAL    Received request from rounding and patient. Upon review of chart and communication with care team, patient's decision making abilities are not in question. . Patient and Child/Children was/were present in the room during visit. Goals of ACP Conversation:  Discuss advance care planning documents  Facilitate a discussion related to patient's goals of care as they align with the patient's values and beliefs. Health Care Decision Makers:       Primary Decision Maker: Tammy Early - [phone]    Secondary Decision Maker: Ginger Mendozaes - [phone]  Summary:  Completed [facility] Decision Maker  Documented Next of Kin, per patient report    Advance Care Planning Documents (Patient Wishes):  Healthcare Power of /Advance Directive Appointment of [address] [facility] [place]  Living Will/Advance Directive  Anatomical Gift/Organ Donation   Patient expressed her desire for all treatments necessary to prolong her life as long as possible within the limits of the acceptable standards. In the event patient's condition makes her loose awareness of herself, others and unable to communicate her needs, patient also desired to try all treatments for a period of five days, after which, if there's no improvement to her condition, her healthcare decision makers in consultation with the physician may withdraw all treatments. Assessment:  Patient had conversation with her daughter and her best friend about things she would like done should something bad happen to her in the past.  Patient was grateful that she was able to complete her Advance Medical Directive with 's assistance.   Interventions:  Provided education on documents for clarity and greater understanding  Discussed and provided education on state decision maker hierarchy  Assisted

## 2023-12-04 ENCOUNTER — TELEPHONE (OUTPATIENT)
Age: 68
End: 2023-12-04

## 2023-12-04 NOTE — TELEPHONE ENCOUNTER
Pt left vm stating she had Hernia repair surgery 11/29 and has been on a liquid diet , she has not had any bowel movements and has been feeling jittery while taking the pain meds, pt tried to eat a soft scrambled egg yesterday, but would like to know what you would advise that she can do or eat.

## 2023-12-05 NOTE — TELEPHONE ENCOUNTER
Called and informed the patient that she could continue her pain medication and to add miralax in to help with constipation per Dr. Shi Martínez. Patient stated that she would like a refill of the Oxycodone and I informed the patient that she will need to contact who ever manages her pain for more medications. Patient understood.

## 2023-12-08 ENCOUNTER — TELEPHONE (OUTPATIENT)
Age: 68
End: 2023-12-08

## 2023-12-08 NOTE — TELEPHONE ENCOUNTER
I spoke with Dr. Bisi Alegria prior to calling the patient. Called and spoke with the patient. Asked the patient if she was taking her anxiety medication ( per Dr. Bisi Alegria) which she stated she wasn't. I instructed the patient to start taking those medication. Informed the patient that she could increase her Miralax and could add a suppository to help with the constipation. I informed the patient that the constipation is to be expected while on a liquid diet. I instructed the patient to increase her liquids and went over everything we previously addressed. I told the patient that if it gets really bad over the weekend that she should follow up in the ER and that we will see her on 12/14/2023. Patient stated that the appointment was \"far off and hopes she doesn't drop dead by then. \"

## 2023-12-08 NOTE — TELEPHONE ENCOUNTER
Patient is concerned that she has had Colace, Metamucil and laxatives but she cannot move her bowels. Her side is tight and looks distended on her left side. Also, explains she cannot put pressure on one of her legs and she is concerned about having a blood clot. No pain but she is hungry and constipated. Please advise.

## 2023-12-14 ENCOUNTER — OFFICE VISIT (OUTPATIENT)
Age: 68
End: 2023-12-14

## 2023-12-14 VITALS
OXYGEN SATURATION: 91 % | RESPIRATION RATE: 14 BRPM | HEIGHT: 66 IN | DIASTOLIC BLOOD PRESSURE: 75 MMHG | BODY MASS INDEX: 35.2 KG/M2 | SYSTOLIC BLOOD PRESSURE: 128 MMHG | HEART RATE: 69 BPM | WEIGHT: 219 LBS | TEMPERATURE: 97.2 F

## 2023-12-14 DIAGNOSIS — Z09 POSTOPERATIVE EXAMINATION: Primary | ICD-10-CM

## 2023-12-14 PROCEDURE — 99024 POSTOP FOLLOW-UP VISIT: CPT | Performed by: STUDENT IN AN ORGANIZED HEALTH CARE EDUCATION/TRAINING PROGRAM

## 2023-12-28 ENCOUNTER — OFFICE VISIT (OUTPATIENT)
Age: 68
End: 2023-12-28

## 2023-12-28 VITALS
RESPIRATION RATE: 16 BRPM | OXYGEN SATURATION: 95 % | HEART RATE: 73 BPM | BODY MASS INDEX: 34.04 KG/M2 | DIASTOLIC BLOOD PRESSURE: 77 MMHG | TEMPERATURE: 97.9 F | WEIGHT: 211.8 LBS | HEIGHT: 66 IN | SYSTOLIC BLOOD PRESSURE: 123 MMHG

## 2023-12-28 DIAGNOSIS — Z09 POSTOPERATIVE EXAMINATION: Primary | ICD-10-CM

## 2023-12-28 PROCEDURE — 99024 POSTOP FOLLOW-UP VISIT: CPT | Performed by: STUDENT IN AN ORGANIZED HEALTH CARE EDUCATION/TRAINING PROGRAM

## 2024-01-03 ENCOUNTER — TELEPHONE (OUTPATIENT)
Age: 69
End: 2024-01-03

## 2024-01-03 NOTE — TELEPHONE ENCOUNTER
Pt called in stating she is exp. swelling and  pain around upper excision site and can feel a \"nodule\" in the area, please advise.

## 2024-01-04 ENCOUNTER — OFFICE VISIT (OUTPATIENT)
Age: 69
End: 2024-01-04

## 2024-01-04 VITALS
TEMPERATURE: 98.2 F | HEIGHT: 66 IN | SYSTOLIC BLOOD PRESSURE: 126 MMHG | DIASTOLIC BLOOD PRESSURE: 82 MMHG | OXYGEN SATURATION: 96 % | BODY MASS INDEX: 34.81 KG/M2 | WEIGHT: 216.6 LBS | RESPIRATION RATE: 16 BRPM | HEART RATE: 87 BPM

## 2024-01-04 DIAGNOSIS — Z09 POSTOPERATIVE EXAMINATION: Primary | ICD-10-CM

## 2024-01-04 PROCEDURE — 99024 POSTOP FOLLOW-UP VISIT: CPT | Performed by: STUDENT IN AN ORGANIZED HEALTH CARE EDUCATION/TRAINING PROGRAM

## 2024-01-04 NOTE — TELEPHONE ENCOUNTER
Nurse Triage Wound Concern    Date of surgery: 11/29/2023  What surgery:   LAPAROSCOPIC PARAESOPHAGEAL HERNIA REPAIR WITH MESH  WITH PARTIAL FUNDOPLICATION (Abdomen)   (If laparoscopic) which wound is the patient calling about? The patient states the upper wound  When did this start? 4-5 days ago  Any trauma/injury to the wound? No  Any recent heavy lifting? No  Has wound opened up? No  Is there pain? Yes  If yes, has pain worsened or gotten better? Patient states that she can't lay on her left side  Is there drainage? No  What color/consistency is drainage? N/A  Is there bleeding? N/A  Is area around wound warm? No  Is area around wound red? No  Is there swelling around/under wound? No  Does the patient have a fever? NO  Has the patient taken anything PO or used anything topically? No    Patient states that she feels a lump on the left side near. Patient states that she did go back to Dr. Zaragoza and had 3 injections and felt better.  Patient is complaining of a smell as well.

## 2024-01-04 NOTE — PROGRESS NOTES
Postop Progress Note    Subjective    Mine Fernandes presents to the office for postop follow up.  She has had a 1 week history of burning/pulling pain at her left upper quadrant port site.  She describes this is different from the relatively chronic lower left-sided pain that she has had for a number of years.  She recently saw Dr. Zaragoza who did injections in the left lower quadrant and she has noticed an improvement in her chronic pain, though her current complaints are distinct.  She says that this current pain started before her injections.  She has been taking some oxycodone and lidocaine patches with some relief.    Objective    Vitals:    01/04/24 1316   BP: 126/82   Pulse: 87   Resp: 16   Temp: 98.2 °F (36.8 °C)   SpO2: 96%     General: alert, cooperative and no distress  Incision: healing well.  No drainage, seromas, concern for port site hernias.  Mild to moderately tender at left upper quadrant incision site.  No overlying skin changes.    Assessment  Doing well postoperatively.    Plan  Signs and symptoms most consistent with muscle spasm at larger port sites with transfascial sutures in place.  Start muscle relaxant twice daily.  Patient has some tizanidine at home that she took previously and would like to try that first.  Recommended 2 mg twice daily.  If no relief from that, we could try baclofen  Cautioned her on lifting restrictions.  Continue regular diet  Follow-up as needed or if pain worsens or not resolved.    Electronically signed by Vicente Christianson MD on 1/4/2024 at 1:35 PM

## 2024-01-11 ENCOUNTER — TELEPHONE (OUTPATIENT)
Age: 69
End: 2024-01-11

## 2024-01-11 DIAGNOSIS — G89.18 POST-OP PAIN: Primary | ICD-10-CM

## 2024-01-11 RX ORDER — LIDOCAINE 50 MG/G
1 PATCH TOPICAL DAILY
Qty: 10 PATCH | Refills: 0 | Status: SHIPPED | OUTPATIENT
Start: 2024-01-11 | End: 2024-01-21

## 2024-01-11 RX ORDER — BACLOFEN 10 MG/1
10 TABLET ORAL 3 TIMES DAILY
Qty: 40 TABLET | Refills: 0 | Status: SHIPPED | OUTPATIENT
Start: 2024-01-11

## 2024-01-11 NOTE — TELEPHONE ENCOUNTER
Called and informed patient that medications were called in. Informed patient to try this out first and if no relief to call and schedule an appointment. Patient verbalized understanding.

## 2024-01-11 NOTE — TELEPHONE ENCOUNTER
Patient called and is requesting pain medication. Patient is complaining of stabbing pain 2-3 inches from her incision sight and states that the muscle relaxer's are not working. Would like to try something else. Patient has the correct pharmacy listed in the chart.

## 2024-01-19 ENCOUNTER — TELEPHONE (OUTPATIENT)
Age: 69
End: 2024-01-19

## 2024-01-19 NOTE — TELEPHONE ENCOUNTER
Patient called and stated that it was mentioned at her last visit that she may need IV therapy. Patient states that her skin and eyes are dry and she is constipated. She stated that she just can't seem to get enough water. She would like to have services set up for her on [place] side of town. Patient can be reached at [phone].

## 2024-05-30 ENCOUNTER — COMPREHENSIVE EXAM (OUTPATIENT)
Dept: URBAN - METROPOLITAN AREA CLINIC 1 | Facility: CLINIC | Age: 69
End: 2024-05-30

## 2024-05-30 DIAGNOSIS — H43.812: ICD-10-CM

## 2024-05-30 DIAGNOSIS — Z96.1: ICD-10-CM

## 2024-05-30 DIAGNOSIS — H16.142: ICD-10-CM

## 2024-05-30 DIAGNOSIS — H04.123: ICD-10-CM

## 2024-05-30 DIAGNOSIS — H10.45: ICD-10-CM

## 2024-05-30 DIAGNOSIS — H26.493: ICD-10-CM

## 2024-05-30 DIAGNOSIS — H35.371: ICD-10-CM

## 2024-05-30 PROCEDURE — 99214 OFFICE O/P EST MOD 30 MIN: CPT

## 2024-05-30 PROCEDURE — 92015 DETERMINE REFRACTIVE STATE: CPT

## 2024-05-30 ASSESSMENT — KERATOMETRY
OD_AXISANGLE2_DEGREES: 6
OS_K1POWER_DIOPTERS: 37.00
OD_K1POWER_DIOPTERS: 40.75
OS_AXISANGLE_DEGREES: 086
OD_AXISANGLE_DEGREES: 096
OS_AXISANGLE2_DEGREES: 176
OD_K2POWER_DIOPTERS: 42.00
OS_K2POWER_DIOPTERS: 40.25

## 2024-05-30 ASSESSMENT — TONOMETRY
OS_IOP_MMHG: 10
OD_IOP_MMHG: 10

## 2024-05-30 ASSESSMENT — VISUAL ACUITY
OD_BAT: 20/40
OS_CC: 20/25-2
OD_CC: 20/20-2
OS_BAT: 20/400

## 2024-08-01 ENCOUNTER — FOLLOW UP (OUTPATIENT)
Dept: URBAN - METROPOLITAN AREA CLINIC 1 | Facility: CLINIC | Age: 69
End: 2024-08-01

## 2024-08-01 DIAGNOSIS — H16.142: ICD-10-CM

## 2024-08-01 DIAGNOSIS — H04.123: ICD-10-CM

## 2024-08-01 PROCEDURE — 99213 OFFICE O/P EST LOW 20 MIN: CPT

## 2024-08-01 ASSESSMENT — KERATOMETRY
OS_K2POWER_DIOPTERS: 40.25
OS_AXISANGLE2_DEGREES: 176
OD_K1POWER_DIOPTERS: 40.75
OS_AXISANGLE_DEGREES: 086
OD_AXISANGLE_DEGREES: 096
OD_K2POWER_DIOPTERS: 42.00
OD_AXISANGLE2_DEGREES: 6
OS_K1POWER_DIOPTERS: 37.00

## 2024-08-01 ASSESSMENT — VISUAL ACUITY
OS_CC: 20/25-1
OS_CC: J1
OD_CC: J1
OD_CC: 20/20

## 2024-08-01 ASSESSMENT — TONOMETRY
OS_IOP_MMHG: 10
OD_IOP_MMHG: 10

## 2024-11-19 ENCOUNTER — OFFICE VISIT (OUTPATIENT)
Age: 69
End: 2024-11-19
Payer: MEDICARE

## 2024-11-19 VITALS — BODY MASS INDEX: 34.91 KG/M2 | WEIGHT: 217.2 LBS | HEIGHT: 66 IN

## 2024-11-19 DIAGNOSIS — M54.42 CHRONIC LEFT-SIDED LOW BACK PAIN WITH LEFT-SIDED SCIATICA: Primary | ICD-10-CM

## 2024-11-19 DIAGNOSIS — G89.29 CHRONIC LEFT-SIDED LOW BACK PAIN WITH LEFT-SIDED SCIATICA: Primary | ICD-10-CM

## 2024-11-19 DIAGNOSIS — M43.16 SPONDYLOLISTHESIS OF LUMBAR REGION: ICD-10-CM

## 2024-11-19 PROCEDURE — 1123F ACP DISCUSS/DSCN MKR DOCD: CPT | Performed by: ORTHOPAEDIC SURGERY

## 2024-11-19 PROCEDURE — 1126F AMNT PAIN NOTED NONE PRSNT: CPT | Performed by: ORTHOPAEDIC SURGERY

## 2024-11-19 PROCEDURE — 99213 OFFICE O/P EST LOW 20 MIN: CPT | Performed by: ORTHOPAEDIC SURGERY

## 2024-11-19 NOTE — PROGRESS NOTES
Transportation Needs: Not on file   Physical Activity: Not on file   Stress: Not on file   Social Connections: Not on file   Intimate Partner Violence: Not on file   Housing Stability: Not on file       REVIEW OF SYSTEMS:      Negative except for that stated above.     [unfilled]      Prescription medication management discussed with patient.     Electronically signed by: Hipolito So DO    Note: This note was completed using voice recognition software.  Any typographical/name errors or mistakes are unintentional.     The patient (or guardian, if applicable) and other individuals in attendance with the patient were advised that Artificial Intelligence will be utilized during this visit to record, process the conversation to generate a clinical note, and support improvement of the AI technology. The patient (or guardian, if applicable) and other individuals in attendance at the appointment consented to the use of AI, including the recording.

## 2025-01-13 NOTE — PROGRESS NOTES
[facility]    [facility], [address]  [place] [address]  Phone: [phone]  Fax: [phone]        Mine Fernandes  : 1955  PCP: Catalina Lares MD    PROGRESS NOTE      ASSESSMENT AND PLAN    Mine was seen today for buttocks pain and groin pain.    Diagnoses and all orders for this visit:    Iliac crest bone pain  -     MRI PELVIS W WO CONTRAST; Future  -     ketorolac (TORADOL) injection 15 mg    Lumbar spondylosis  -     [80306] LS Spine 4V  -     ketorolac (TORADOL) injection 15 mg        Mine Fernandes is a 69 y.o. female with atypical symptoms. Progressive left iliac crest pain with weightbearing.  Hip films benign no specific low back pain, no radiculopathy.  Left inguinal suspected neuroma resected with temporary benefit  Reviewed lumbar MRI.  No indication for spine injections  Recommend MRI pelvis without contrast for further evaluation of iliac pain.  Patient prefers [facility]  Continue hydrocodone and tizanidine through Dr. Lares, PCP    Follow-up and Dispositions    Return for fu MRI/CT/EDx.         HISTORY OF PRESENT ILLNESS    Mine Fernandes is a 69 y.o. female presents for evaluation of low back pain. Patient was last seen by Dr. Starkey (8/10/23). Estim unit. Lidoderm patch. Consider SNRB or MBB/RFA    Patient returns today reporting worsening of her left hip pain.  She had a neuroma removal 2024 from her left inguinal region.  This helped for several weeks.  Now she has had progression of her pain which extends from her lower lumbar and radiates into her anterior iliac region.  She denies GYN issues.    Denies any paresthesias into the lower extremities.  Wants to discuss injections.    She is miserable.  She bakes in his had difficulty weightbearing which has been progressive.    Denies red flags including persistent fevers, chills, weight changes, neurogenic bowel or bladder symptoms.  Reports 2 years of constipation and

## 2025-01-14 ENCOUNTER — OFFICE VISIT (OUTPATIENT)
Age: 70
End: 2025-01-14
Payer: MEDICARE

## 2025-01-14 VITALS
WEIGHT: 215 LBS | BODY MASS INDEX: 34.55 KG/M2 | OXYGEN SATURATION: 96 % | HEIGHT: 66 IN | SYSTOLIC BLOOD PRESSURE: 111 MMHG | DIASTOLIC BLOOD PRESSURE: 75 MMHG | TEMPERATURE: 97.7 F | HEART RATE: 81 BPM

## 2025-01-14 DIAGNOSIS — M89.8X8 ILIAC CREST BONE PAIN: Primary | ICD-10-CM

## 2025-01-14 DIAGNOSIS — M89.8X8 ILIAC CREST BONE PAIN: ICD-10-CM

## 2025-01-14 DIAGNOSIS — M47.816 LUMBAR SPONDYLOSIS: ICD-10-CM

## 2025-01-14 PROCEDURE — 96372 THER/PROPH/DIAG INJ SC/IM: CPT | Performed by: PHYSICAL MEDICINE & REHABILITATION

## 2025-01-14 PROCEDURE — 72110 X-RAY EXAM L-2 SPINE 4/>VWS: CPT | Performed by: PHYSICAL MEDICINE & REHABILITATION

## 2025-01-14 PROCEDURE — 99214 OFFICE O/P EST MOD 30 MIN: CPT | Performed by: PHYSICAL MEDICINE & REHABILITATION

## 2025-01-14 PROCEDURE — 1123F ACP DISCUSS/DSCN MKR DOCD: CPT | Performed by: PHYSICAL MEDICINE & REHABILITATION

## 2025-01-14 PROCEDURE — 1159F MED LIST DOCD IN RCRD: CPT | Performed by: PHYSICAL MEDICINE & REHABILITATION

## 2025-01-14 PROCEDURE — 1160F RVW MEDS BY RX/DR IN RCRD: CPT | Performed by: PHYSICAL MEDICINE & REHABILITATION

## 2025-01-14 PROCEDURE — 1126F AMNT PAIN NOTED NONE PRSNT: CPT | Performed by: PHYSICAL MEDICINE & REHABILITATION

## 2025-01-14 RX ORDER — POLYETHYLENE GLYCOL-3350 AND ELECTROLYTES 236; 6.74; 5.86; 2.97; 22.74 G/274.31G; G/274.31G; G/274.31G; G/274.31G; G/274.31G
POWDER, FOR SOLUTION ORAL
COMMUNITY
Start: 2024-12-06

## 2025-01-14 RX ORDER — LOSARTAN POTASSIUM 25 MG/1
TABLET ORAL
COMMUNITY
Start: 2025-01-14 | End: 2025-01-14

## 2025-01-14 RX ORDER — KETOROLAC TROMETHAMINE 15 MG/ML
15 INJECTION, SOLUTION INTRAMUSCULAR; INTRAVENOUS ONCE
Status: COMPLETED | OUTPATIENT
Start: 2025-01-14 | End: 2025-01-14

## 2025-01-14 RX ORDER — TRIAMCINOLONE ACETONIDE 1 MG/G
OINTMENT TOPICAL
COMMUNITY
Start: 2025-01-09

## 2025-01-14 RX ORDER — ERGOCALCIFEROL 1.25 MG/1
CAPSULE, LIQUID FILLED ORAL
COMMUNITY
Start: 2024-12-22

## 2025-01-14 RX ADMIN — KETOROLAC TROMETHAMINE 15 MG: 15 INJECTION, SOLUTION INTRAMUSCULAR; INTRAVENOUS at 12:27

## 2025-01-14 NOTE — PROGRESS NOTES
Consent was explained to the pt and signed. No questions or concerns voiced at this time. Pt given 15mg/1ml of toradol IM in left gluteus. No sign or symptoms of infection noted at injection site. There was no bleeding, swelling or leaking noted after injection. Pt handed injection information sheet to take home. Ms. Fernandes tolerated the injection well and did not want to wait in the exam room for observation. She ambulated to check out with out any issues.

## 2025-01-14 NOTE — PROGRESS NOTES
Mine Fernandes presents today for   Chief Complaint   Patient presents with    Buttocks Pain     left    Groin Pain     left       Is someone accompanying this pt? no    Is the patient using any DME equipment during OV? no    Coordination of Care:  1. Have you been to the ER, urgent care clinic since your last visit? Yes, pt went to the ER for cellulitis in September.  Hospitalized since your last visit? Yes, pt has had two out pt surgeries done    2. Have you seen or consulted any other health care providers outside of the [facility] since your last visit? Yes, ortho, general surgery Include any pap smears or colon screening. no

## 2025-02-06 ENCOUNTER — TELEPHONE (OUTPATIENT)
Age: 70
End: 2025-02-06

## 2025-02-06 DIAGNOSIS — M25.552 PAIN IN LEFT HIP: ICD-10-CM

## 2025-02-06 DIAGNOSIS — M89.8X8 ILIAC CREST BONE PAIN: Primary | ICD-10-CM

## 2025-02-06 NOTE — TELEPHONE ENCOUNTER
I will cancel MRI order.  I am going to put in for a pelvic CT.  I am looking for a bony lesion on her iliac crest.

## 2025-02-06 NOTE — TELEPHONE ENCOUNTER
Leta becker/ [employer] called stating a peer to peer is needed:    MRI PELVIS W WO CONTRAST.  Reason: addition information is needed to complete this review    peer to peer # [phone]  tracking # [id number]    website: [employer]    fax: [phone]

## 2025-03-06 ENCOUNTER — OFFICE VISIT (OUTPATIENT)
Age: 70
End: 2025-03-06
Payer: MEDICARE

## 2025-03-06 VITALS
BODY MASS INDEX: 35.2 KG/M2 | SYSTOLIC BLOOD PRESSURE: 97 MMHG | TEMPERATURE: 97.3 F | WEIGHT: 219 LBS | HEIGHT: 66 IN | OXYGEN SATURATION: 95 % | DIASTOLIC BLOOD PRESSURE: 63 MMHG | HEART RATE: 69 BPM

## 2025-03-06 DIAGNOSIS — M53.3 SACROILIAC JOINT DYSFUNCTION OF LEFT SIDE: ICD-10-CM

## 2025-03-06 DIAGNOSIS — M43.16 SPONDYLOLISTHESIS OF LUMBAR REGION: Primary | ICD-10-CM

## 2025-03-06 DIAGNOSIS — R10.2 PELVIC PAIN: ICD-10-CM

## 2025-03-06 PROCEDURE — 1126F AMNT PAIN NOTED NONE PRSNT: CPT | Performed by: PHYSICAL MEDICINE & REHABILITATION

## 2025-03-06 PROCEDURE — 1123F ACP DISCUSS/DSCN MKR DOCD: CPT | Performed by: PHYSICAL MEDICINE & REHABILITATION

## 2025-03-06 PROCEDURE — 99214 OFFICE O/P EST MOD 30 MIN: CPT | Performed by: PHYSICAL MEDICINE & REHABILITATION

## 2025-03-06 RX ORDER — FUROSEMIDE 20 MG/1
20 TABLET ORAL DAILY PRN
COMMUNITY
Start: 2025-01-27

## 2025-03-06 RX ORDER — ONDANSETRON 4 MG/1
TABLET, ORALLY DISINTEGRATING ORAL
COMMUNITY

## 2025-03-06 RX ORDER — CICLOPIROX OLAMINE 7.7 MG/G
CREAM TOPICAL 2 TIMES DAILY
COMMUNITY

## 2025-03-06 RX ORDER — OMEPRAZOLE 20 MG/1
CAPSULE, DELAYED RELEASE ORAL
COMMUNITY
Start: 2025-01-17

## 2025-03-06 RX ORDER — GABAPENTIN 100 MG/1
CAPSULE ORAL
COMMUNITY

## 2025-03-06 NOTE — PATIENT INSTRUCTIONS
nerve may be treated.  How long do medial branch block and neurotomy take?  It takes 20 to 30 minutes to get the block. You can go home after the doctor watches you for about an hour.  It takes 45 to 90 minutes to get a neurotomy, depending on how many nerves are heated. You will probably go home 30 to 60 minutes after the procedure.  What can you expect after a neurotomy?  You will get instructions on how to report how much pain you have when you are at home.  You may feel a little sore or tender at the injection site at first. But after a successful neurotomy, most people have pain relief right away. It often lasts for several months, but your pain may come back.  If your pain does come back, it may mean that the damaged nerve has healed and can send pain messages again. Or it can mean that a different nerve is causing pain. Your doctor will discuss your options with you.  Follow-up care is a key part of your treatment and safety. Be sure to make and go to all appointments, and call your doctor if you are having problems. It's also a good idea to know your test results and keep a list of the medicines you take.  Current as of: February 23, 2022               Content Version: 13.5  © 2006-2022 [employer].   Care instructions adapted under license by [employer]. If you have questions about a medical condition or this instruction, always ask your healthcare professional. [employer] disclaims any warranty or liability for your use of this information.

## 2025-03-06 NOTE — PROGRESS NOTES
Niarene Fernandes presents today for   Chief Complaint   Patient presents with    Pelvic Pain     Left side, bikini line       Is someone accompanying this pt? Yes, female     Is the patient using any DME equipment during OV? no      Coordination of Care:  1. Have you been to the ER, urgent care clinic since your last visit? no  Hospitalized since your last visit? no    2. Have you seen or consulted any other health care providers outside of the [facility] since your last visit? Yes, gynecology Include any pap smears or colon screening. no        
injections: yes - Toradol 15mg (ineffective).   Spinal surgery: No  Beneficial medications: Hydrocodone.   Failed medications: Mobic, Mobic, Lidoderm patches, Tylenol     Work Status: [employer]. Last employed March 2020  Pertinent PMHx: Left inguinal suspected neuroma resection 11/24/2024-patient reports benefit x 2 weeks before the return of iliac pain.  Paraesophageal hernia repair with mesh 2023.HTN, CVA, B/L TKA, GERD. CRI. 09/2024 GFR equals 52. Neurofibroma of LE (08/2024).     PHYSICAL EXAMINATION    BP 97/63 (Site: Left Upper Arm, Position: Sitting, Cuff Size: Large Adult)   Pulse 69   Temp 97.3 °F (36.3 °C) (Skin)   Ht 1.676 m (5' 6\")   Wt 99.3 kg (219 lb)   LMP  (LMP Unknown)   SpO2 95% Comment: RA  BMI 35.35 kg/m²     Ambulation without assistive device.     TTP: left posterior iliac crest.   LE strength: Manual muscle testing of the lower extremities hip flexors, hamstrings, quadriceps, dorsiflexors, plantar flexors, and EHL intact.   Groin pain with L hip ROM  SLR: Negative  No edema     By signing my name below, I, TERESA Marquez, attest that this documentation has been prepared under the direction and in the presence of DELMA CLARK MD  Electronically signed: TERESA Marquez. 3/6/25 12:34 PM EST     I, Delma Clark MD, personally performed the services described in this documentation. I have authorized the scribe to complete the medical record entries input within this chart. I have reviewed the chart and agree that the record reflects my personal performance and is accurate and complete. [Electronically Signed: Delma Clark MD. 3/6/2025 4:59 PM ]    Portions of this note was created using Dragon transcription software and may contain unintended errors.

## 2025-05-05 ENCOUNTER — COMPREHENSIVE EXAM (OUTPATIENT)
Age: 70
End: 2025-05-05

## 2025-05-05 DIAGNOSIS — H26.493: ICD-10-CM

## 2025-05-05 DIAGNOSIS — H16.142: ICD-10-CM

## 2025-05-05 DIAGNOSIS — Z96.1: ICD-10-CM

## 2025-05-05 DIAGNOSIS — H04.123: ICD-10-CM

## 2025-05-05 DIAGNOSIS — H43.812: ICD-10-CM

## 2025-05-05 DIAGNOSIS — H10.45: ICD-10-CM

## 2025-05-05 DIAGNOSIS — H35.371: ICD-10-CM

## 2025-05-05 PROCEDURE — 92015 DETERMINE REFRACTIVE STATE: CPT

## 2025-05-05 PROCEDURE — 99214 OFFICE O/P EST MOD 30 MIN: CPT

## 2025-07-14 NOTE — PROGRESS NOTES
PHYSICAL / OCCUPATIONAL THERAPY - DAILY TREATMENT NOTE (updated )    Patient Name: Sunny Rey    Date: 2023    : 1955  Insurance: Payor: Jorge Mccartney / Plan: Jorge Killer / Product Type: *No Product type* /      Patient  verified yes     Visit #   Current / Total 5 8-12   Time   In / Out 11:23 12:05   Pain   In / Out 1 1   Subjective Functional Status/Changes: Pt reports her stomach hurt her for 27 hours after she had the inflammation in her stomach, then once it went away she didn't feel the pain in the left side of her stomach either, but then it did come back. Pt reports /10 pain with taking pain medication earlier today. Changes to:  Meds, Allergies, Med Hx, Sx Hx? If yes, update Summary List no       TREATMENT AREA =  Primary osteoarthritis of left hip [M16.12]    OBJECTIVE    Therapeutic Procedures: Tx Min Billable or 1:1 Min (if diff from Tx Min) Procedure, Rationale, Specifics   20  87587 Therapeutic Exercise (timed):  increase ROM, strength, coordination, balance, and proprioception to improve patient's ability to progress to PLOF and address remaining functional goals. (see flow sheet as applicable)     Details if applicable:       10  32162 Neuromuscular Re-Education (timed):  improve balance, coordination, kinesthetic sense, posture, core stability and proprioception to improve patient's ability to develop conscious control of individual muscles and awareness of position of extremities in order to progress to PLOF and address remaining functional goals. (see flow sheet as applicable)     Details if applicable:  Static cupping with ROM ex    10  01261 Manual Therapy (timed):  decrease pain, increase tissue extensibility, and decrease trigger points to improve patient's ability to progress to PLOF and address remaining functional goals. The manual therapy interventions were performed at a separate and distinct time from the therapeutic activities interventions . 72.6

## 2025-08-08 ENCOUNTER — OFFICE VISIT (OUTPATIENT)
Age: 70
End: 2025-08-08
Payer: MEDICARE

## 2025-08-08 VITALS
BODY MASS INDEX: 37.28 KG/M2 | HEART RATE: 80 BPM | WEIGHT: 232 LBS | DIASTOLIC BLOOD PRESSURE: 83 MMHG | OXYGEN SATURATION: 95 % | SYSTOLIC BLOOD PRESSURE: 165 MMHG | HEIGHT: 66 IN

## 2025-08-08 DIAGNOSIS — I10 PRIMARY HYPERTENSION: ICD-10-CM

## 2025-08-08 DIAGNOSIS — R06.02 EXERTIONAL SHORTNESS OF BREATH: Primary | ICD-10-CM

## 2025-08-08 DIAGNOSIS — R00.2 PALPITATION: ICD-10-CM

## 2025-08-08 DIAGNOSIS — I63.39 CEREBROVASCULAR ACCIDENT (CVA) DUE TO THROMBOSIS OF OTHER CEREBRAL ARTERY (HCC): ICD-10-CM

## 2025-08-08 DIAGNOSIS — R55 NEAR SYNCOPE: ICD-10-CM

## 2025-08-08 DIAGNOSIS — R01.1 SYSTOLIC MURMUR: ICD-10-CM

## 2025-08-08 DIAGNOSIS — I95.1 ORTHOSTATIC HYPOTENSION: ICD-10-CM

## 2025-08-08 DIAGNOSIS — E78.00 HYPERCHOLESTEREMIA: ICD-10-CM

## 2025-08-08 PROCEDURE — 3079F DIAST BP 80-89 MM HG: CPT | Performed by: INTERNAL MEDICINE

## 2025-08-08 PROCEDURE — 1123F ACP DISCUSS/DSCN MKR DOCD: CPT | Performed by: INTERNAL MEDICINE

## 2025-08-08 PROCEDURE — 93000 ELECTROCARDIOGRAM COMPLETE: CPT | Performed by: INTERNAL MEDICINE

## 2025-08-08 PROCEDURE — 1126F AMNT PAIN NOTED NONE PRSNT: CPT | Performed by: INTERNAL MEDICINE

## 2025-08-08 PROCEDURE — 3077F SYST BP >= 140 MM HG: CPT | Performed by: INTERNAL MEDICINE

## 2025-08-08 PROCEDURE — 99215 OFFICE O/P EST HI 40 MIN: CPT | Performed by: INTERNAL MEDICINE

## 2025-08-08 RX ORDER — TORSEMIDE 20 MG/1
20 TABLET ORAL DAILY
COMMUNITY

## 2025-08-08 RX ORDER — LOSARTAN POTASSIUM 25 MG/1
25 TABLET ORAL DAILY
COMMUNITY

## 2025-08-08 RX ORDER — DAPAGLIFLOZIN 10 MG/1
10 TABLET, FILM COATED ORAL DAILY
COMMUNITY

## 2025-08-08 RX ORDER — PROPRANOLOL HYDROCHLORIDE 10 MG/1
10 TABLET ORAL DAILY PRN
COMMUNITY

## 2025-08-08 ASSESSMENT — ENCOUNTER SYMPTOMS
SHORTNESS OF BREATH: 1
GASTROINTESTINAL NEGATIVE: 1
EYES NEGATIVE: 1
ALLERGIC/IMMUNOLOGIC NEGATIVE: 1

## 2025-08-08 ASSESSMENT — PATIENT HEALTH QUESTIONNAIRE - PHQ9
SUM OF ALL RESPONSES TO PHQ QUESTIONS 1-9: 0
2. FEELING DOWN, DEPRESSED OR HOPELESS: NOT AT ALL
1. LITTLE INTEREST OR PLEASURE IN DOING THINGS: NOT AT ALL
SUM OF ALL RESPONSES TO PHQ QUESTIONS 1-9: 0

## 2025-08-14 DIAGNOSIS — I10 PRIMARY HYPERTENSION: Primary | ICD-10-CM

## 2025-08-14 RX ORDER — NIFEDIPINE 30 MG/1
30 TABLET, EXTENDED RELEASE ORAL DAILY
Qty: 30 TABLET | Refills: 5 | Status: SHIPPED | OUTPATIENT
Start: 2025-08-14

## (undated) DEVICE — KIT SUTURING DEVICE M-CLOSE

## (undated) DEVICE — APPLICATOR LAP 35 CM 2 RIGID VISTASEAL

## (undated) DEVICE — PENROSE DRAIN 12" X 1/4: Brand: CARDINAL HEALTH

## (undated) DEVICE — TROCAR ENDOSCP L100MM DIA12MM STBL SL BLDELSS ENDOPATH XCEL

## (undated) DEVICE — SOLUTION ANTIFOG VIS SYS CLEARIFY LAPSCP

## (undated) DEVICE — SHEARS ENDOSCP HARM 36CM ULTRASONIC CRV TIP UPGRD

## (undated) DEVICE — SCISSORS ENDOSCP DIA5MM CRV MPLR CAUT W/ RATCH HNDL

## (undated) DEVICE — PLEDGET SURG W0.375XL0.062IN THK1.5MM WHT SFT PTFE RECT

## (undated) DEVICE — SUTURE NABSORBABLE L36IN SZ 2 0 GRN SH L26MM 1 2 CIR NDL DBL

## (undated) DEVICE — VISIGI 3D®  CALIBRATION SYSTEM  SIZE 40FR SLEEVE/STD: Brand: BOEHRINGER® VISIGI™ 3D SLEEVE GASTRECTOMY CALIBRATION SYSTEM, SIZE 40FR

## (undated) DEVICE — NEEDLE SPNL L3.5IN PNK HUB S STL REG WALL FIT STYL W/ QNCKE

## (undated) DEVICE — SYRINGE MED 30ML STD CLR PLAS LUERLOCK TIP N CTRL DISP

## (undated) DEVICE — SUTURE PDS II SZ 0 L36IN ABSRB VLT L36MM CT-1 1/2 CIR Z346H

## (undated) DEVICE — SLEEVE TRCR L100MM DIA5MM UNIV STBL FOR BLDELSS DIL TIP

## (undated) DEVICE — SUTURE PERMA-HAND SZ 2-0 L30IN NONABSORBABLE BLK L26MM SH K833H

## (undated) DEVICE — SUTURE MCRYL SZ 4-0 L27IN ABSRB UD L24MM PS-1 3/8 CIR PRIM Y935H

## (undated) DEVICE — TROCAR ENDOSCP L100MM DIA15MM BLDELSS STBL SL ENDOPATH XCEL